# Patient Record
Sex: FEMALE | Race: BLACK OR AFRICAN AMERICAN | NOT HISPANIC OR LATINO | URBAN - METROPOLITAN AREA
[De-identification: names, ages, dates, MRNs, and addresses within clinical notes are randomized per-mention and may not be internally consistent; named-entity substitution may affect disease eponyms.]

---

## 2018-11-01 ENCOUNTER — OFFICE (OUTPATIENT)
Dept: URBAN - METROPOLITAN AREA CLINIC 14 | Facility: CLINIC | Age: 33
End: 2018-11-01

## 2018-11-01 VITALS
DIASTOLIC BLOOD PRESSURE: 88 MMHG | HEIGHT: 62 IN | SYSTOLIC BLOOD PRESSURE: 132 MMHG | WEIGHT: 278 LBS | HEART RATE: 79 BPM

## 2018-11-01 DIAGNOSIS — R14.0 ABDOMINAL DISTENSION (GASEOUS): ICD-10-CM

## 2018-11-01 DIAGNOSIS — R10.84 GENERALIZED ABDOMINAL PAIN: ICD-10-CM

## 2018-11-01 DIAGNOSIS — E66.01 MORBID (SEVERE) OBESITY DUE TO EXCESS CALORIES: ICD-10-CM

## 2018-11-01 DIAGNOSIS — R19.7 DIARRHEA, UNSPECIFIED: ICD-10-CM

## 2018-11-01 DIAGNOSIS — R93.3 ABNORMAL FINDINGS ON DIAGNOSTIC IMAGING OF OTHER PARTS OF DI: ICD-10-CM

## 2018-11-01 DIAGNOSIS — R11.0 NAUSEA: ICD-10-CM

## 2018-11-01 DIAGNOSIS — K80.20 CALCULUS OF GALLBLADDER WITHOUT CHOLECYSTITIS WITHOUT OBSTRU: ICD-10-CM

## 2018-11-01 DIAGNOSIS — R19.4 CHANGE IN BOWEL HABIT: ICD-10-CM

## 2018-11-01 PROCEDURE — 99204 OFFICE O/P NEW MOD 45 MIN: CPT | Performed by: INTERNAL MEDICINE

## 2018-11-01 RX ORDER — SODIUM PICOSULFATE, MAGNESIUM OXIDE, AND ANHYDROUS CITRIC ACID 10; 3.5; 12 MG/160ML; G/160ML; G/160ML
LIQUID ORAL
Qty: 1 | Refills: 0 | Status: ACTIVE
Start: 2018-11-01

## 2018-11-01 NOTE — SERVICENOTES
Though she has gallstones, her symptoms are acute in onset in appear most consistent with a viral colitis.  Given her recent antibiotics C diff is in the differential so we will check some stool studies.  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  She apparently has had some issues with diarrhea in the past and so because of this and her imaging study she is interested in colonoscopic evaluation.  We will have her complete her course of antibiotics and schedule colonoscopy in about a month or so.  If symptoms worsen the meantime we can proceed earlier.  She does appear to already be getting better.  If she starts having more upper abdominal symptoms such as symptoms consistent with biliary colic then we may refer her to a surgeon but at this time, given her acute onset of symptoms and improvement as well as imaging studies suggesting colitis we will hold off on surgical referral for her gallstones unless she has more typical symptoms.

## 2018-11-01 NOTE — SERVICEHPINOTES
Ms. 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 The patient states that she was in her normal state of health until last Tuesday night when she started having acute onset abdominal pain associated with a change in bowel habits with multiple frequent bowel movements which were initially formed but then became very soft to loose. There was no watery diarrhea. There was no rectal bleeding. She was having diarrhea about 10 times a day. Because of worsening symptoms and worsening pain she did go to the emergency department this past Monday where blood work was overall normal but CT scan did reveal numerous stones within the gallbladder with no evidence of cholecystitis or choledocholithiasis and diffuse wall thickening with pericolonic stranding throughout the proximal mid right colon consistent with colitis. The patient was given Cipro and Flagyl for 10 day course and states that since then her symptoms have slowly improved. She states that she has less pain with eating now and has tolerated food. Her nausea has improved. Her bowel habits have also improved but are not quite back to normal. She states that prior to her symptoms starting she had just completed a course of cefuroxime. She denies any first-degree family history of colon cancer but thinks that her mother had colon polyps. She denies any first-degree family history of inflammatory bowel disease. She has never had a colonoscopy. She states that at baseline her stomach is week but she does not have any issues with severe abdominal pain or diarrhea. She denies any right upper quadrant pain or pain radiating to her flank. She denies any jaundice.

## 2021-12-08 ENCOUNTER — CLINICAL SUPPORT (OUTPATIENT)
Dept: OCCUPATIONAL MEDICINE | Facility: URGENT CARE | Age: 36
End: 2021-12-08

## 2021-12-08 DIAGNOSIS — Z02.89 ENCOUNTER FOR PHYSICAL EXAMINATION RELATED TO EMPLOYMENT: Primary | ICD-10-CM

## 2021-12-08 PROCEDURE — 97799 UNLISTED PHYSCL MED/REHAB PX: CPT | Mod: FIT | Performed by: STUDENT IN AN ORGANIZED HEALTH CARE EDUCATION/TRAINING PROGRAM

## 2022-03-18 ENCOUNTER — TELEPHONE (OUTPATIENT)
Dept: DERMATOLOGY | Facility: CLINIC | Age: 37
End: 2022-03-18

## 2022-03-18 NOTE — TELEPHONE ENCOUNTER
Caller would like to discuss RS Appointment     Patient: Virgen Em    Callback Number: [phone]    Best Availability: as soon as possible    Initial Appt:3/21/22    Provider: karthik    Reason for rs: has another appt at this time    PAS attempted to contact FD? yes    Additional Info: Needs to reschedule above appt's time, she is wanting to see if it can be moved to a later time      I advised caller of a callback by 24-48 hours.

## 2022-03-23 ENCOUNTER — OFFICE VISIT (OUTPATIENT)
Dept: URGENT CARE | Facility: URGENT CARE | Age: 37
End: 2022-03-23
Payer: COMMERCIAL

## 2022-03-23 VITALS
TEMPERATURE: 98.1 F | HEIGHT: 62 IN | DIASTOLIC BLOOD PRESSURE: 94 MMHG | HEART RATE: 97 BPM | RESPIRATION RATE: 16 BRPM | WEIGHT: 261 LBS | OXYGEN SATURATION: 100 % | SYSTOLIC BLOOD PRESSURE: 154 MMHG | BODY MASS INDEX: 48.03 KG/M2

## 2022-03-23 DIAGNOSIS — R11.0 NAUSEA: ICD-10-CM

## 2022-03-23 DIAGNOSIS — R19.7 DIARRHEA, UNSPECIFIED TYPE: Primary | ICD-10-CM

## 2022-03-23 LAB
ALBUMIN SERPL-MCNC: 3.8 G/DL (ref 3.3–5.5)
ALP SERPL-CCNC: 69 U/L (ref 42–141)
ALT SERPL-CCNC: 19 U/L (ref 10–47)
ANION GAP SERPL CALC-SCNC: 7 MMOL/L (ref 3–11)
AST SERPL-CCNC: 22 U/L
BASOPHILS # BLD AUTO: 0.03 10*3/UL
BASOPHILS NFR BLD AUTO: 0 % (ref 0–2)
BILIRUB SERPL-MCNC: 0.5 MG/DL (ref 0.2–1.4)
BUN SERPL-MCNC: 9 MG/DL (ref 7–25)
CALCIUM ALBUM COR SERPL-MCNC: 9.9 MG/DL (ref 8.6–10.3)
CALCIUM SERPL-MCNC: 9.7 MG/DL (ref 8.6–10.3)
CHLORIDE SERPL-SCNC: 107 MMOL/L (ref 98–107)
CO2 SERPL-SCNC: 23 MMOL/L (ref 21–32)
CREAT SERPL-MCNC: 1 MG/DL (ref 0.6–1.1)
EOSINOPHIL # BLD AUTO: 0.19 10*3/UL
EOSINOPHIL NFR BLD AUTO: 2 % (ref 0–3)
ERYTHROCYTE [DISTWIDTH] IN BLOOD BY AUTOMATED COUNT: 13.4 % (ref 11.5–14)
GFR SERPL CREATININE-BSD FRML MDRD: 75 ML/MIN/1.73M*2
GLUCOSE SERPL-MCNC: 90 MG/DL (ref 70–105)
HCT VFR BLD AUTO: 38.9 % (ref 34–45)
HGB BLD-MCNC: 12.9 G/DL (ref 11.5–15.5)
LYMPHOCYTES # BLD AUTO: 3.28 10*3/UL
LYMPHOCYTES NFR BLD AUTO: 39 % (ref 11–47)
MCH RBC QN AUTO: 29.1 PG (ref 28–33)
MCHC RBC AUTO-ENTMCNC: 33.2 G/DL (ref 32–36)
MCV RBC AUTO: 87.8 FL (ref 81–97)
MONOCYTES # BLD AUTO: 0.51 10*3/UL
MONOCYTES NFR BLD AUTO: 6 % (ref 3–11)
NEUTROPHILS # BLD AUTO: 4.32 10*3/UL
NEUTROPHILS NFR BLD AUTO: 52 % (ref 41–81)
PLATELET # BLD AUTO: 373 10*3/UL (ref 140–350)
PMV BLD AUTO: 7 FL (ref 6.9–10.8)
POTASSIUM SERPL-SCNC: 3.7 MMOL/L (ref 3.5–5.1)
PROT SERPL-MCNC: 7.5 G/DL (ref 6.4–8.1)
RBC # BLD AUTO: 4.43 10*6/ΜL (ref 3.7–5.3)
SODIUM SERPL-SCNC: 137 MMOL/L (ref 128–145)
WBC # BLD AUTO: 8.3 10*3/UL (ref 4.5–10.5)

## 2022-03-23 PROCEDURE — 85025 COMPLETE CBC W/AUTO DIFF WBC: CPT | Performed by: PHYSICIAN ASSISTANT

## 2022-03-23 PROCEDURE — 99203 OFFICE O/P NEW LOW 30 MIN: CPT | Performed by: PHYSICIAN ASSISTANT

## 2022-03-23 PROCEDURE — 36415 COLL VENOUS BLD VENIPUNCTURE: CPT | Performed by: PHYSICIAN ASSISTANT

## 2022-03-23 PROCEDURE — 80053 COMPREHEN METABOLIC PANEL: CPT | Performed by: PHYSICIAN ASSISTANT

## 2022-03-23 RX ORDER — MILNACIPRAN HYDROCHLORIDE 100 MG/1
1 TABLET, FILM COATED ORAL 2 TIMES DAILY
COMMUNITY
Start: 2022-01-07

## 2022-03-23 RX ORDER — LAMOTRIGINE 100 MG/1
100 TABLET ORAL DAILY
COMMUNITY
End: 2022-04-14 | Stop reason: DRUGHIGH

## 2022-03-23 RX ORDER — PANTOPRAZOLE SODIUM 40 MG/1
40 TABLET, DELAYED RELEASE ORAL DAILY
COMMUNITY
Start: 2022-02-19

## 2022-03-23 RX ORDER — ONDANSETRON HYDROCHLORIDE 8 MG/1
8 TABLET, FILM COATED ORAL EVERY 8 HOURS PRN
COMMUNITY
Start: 2022-03-09 | End: 2022-07-05 | Stop reason: SDUPTHER

## 2022-03-23 RX ORDER — AZITHROMYCIN 250 MG/1
TABLET, FILM COATED ORAL EVERY 24 HOURS
COMMUNITY
End: 2022-04-14 | Stop reason: ALTCHOICE

## 2022-03-23 RX ORDER — FLUTICASONE FUROATE AND VILANTEROL 100; 25 UG/1; UG/1
POWDER RESPIRATORY (INHALATION)
COMMUNITY
End: 2022-04-14 | Stop reason: ALTCHOICE

## 2022-03-23 RX ORDER — LEVOTHYROXINE SODIUM 25 UG/1
TABLET ORAL
COMMUNITY
End: 2022-04-14 | Stop reason: ALTCHOICE

## 2022-03-23 RX ORDER — MONTELUKAST SODIUM 10 MG/1
TABLET ORAL
COMMUNITY
End: 2022-04-14 | Stop reason: ALTCHOICE

## 2022-03-23 RX ORDER — ALBUTEROL SULFATE 90 UG/1
INHALANT RESPIRATORY (INHALATION)
COMMUNITY

## 2022-03-23 RX ORDER — NORETHINDRONE AND ETHINYL ESTRADIOL 1; .035 MG/1; MG/1
TABLET ORAL
COMMUNITY

## 2022-03-23 RX ORDER — CHOLECALCIFEROL (VITAMIN D3) 50 MCG
TABLET ORAL
COMMUNITY

## 2022-03-23 RX ORDER — CLINDAMYCIN PHOSPHATE 10 UG/ML
LOTION TOPICAL
COMMUNITY
End: 2022-04-14 | Stop reason: ALTCHOICE

## 2022-03-23 RX ORDER — TOPIRAMATE 50 MG/1
3 CAPSULE, EXTENDED RELEASE ORAL DAILY
COMMUNITY
Start: 2022-02-19

## 2022-03-23 RX ORDER — ASPIRIN 325 MG
TABLET, DELAYED RELEASE (ENTERIC COATED) ORAL
COMMUNITY

## 2022-03-23 RX ORDER — ONDANSETRON 4 MG/1
4 TABLET, ORALLY DISINTEGRATING ORAL AS NEEDED
Qty: 12 TABLET | Refills: 0 | Status: SHIPPED | OUTPATIENT
Start: 2022-03-23 | End: 2022-04-14 | Stop reason: SDUPTHER

## 2022-03-23 RX ORDER — METHOCARBAMOL 750 MG/1
TABLET, FILM COATED ORAL
COMMUNITY

## 2022-03-23 RX ORDER — METOPROLOL SUCCINATE 25 MG/1
25 TABLET, EXTENDED RELEASE ORAL DAILY
COMMUNITY
Start: 2022-03-01 | End: 2022-04-14 | Stop reason: ALTCHOICE

## 2022-03-23 ASSESSMENT — PAIN SCALES - GENERAL: PAINLEVEL: 0-NO PAIN

## 2022-03-24 NOTE — PROGRESS NOTES
"Urgent Care Visit Note    Subjective   Chief Complaint  Chief Complaint   Patient presents with   • Diarrhea     C/o diarrhea and nausea since 3/18/22. Reports her stomach cramps with meals and she gets diarrhea within 10-15 minutes. Pt has tried immodium and pepto without relief.       History of Present Illness  Virgen Em is a 37 y.o. female presenting for diarrhea onset over the past 5 to 6 days.  Patient describes the diarrhea as liquidy, and brown.  Over the past few days it is taken on a \"green tinge\", secondary to ingesting Pepto-Bismol for her symptoms.  She reports that her symptoms are aggravated by eating.  She has been attempting to stay hydrated with Gatorade.  She does report eating takeout food prior to onset of symptoms, however her fellow members are not exhibiting similar symptoms.  She otherwise denies any fever, chills, urinary symptoms, vomiting.  No history of food allergies.  Denies chance of pregnancy; is currently on birth control and has not been sexually active recently.  Past abdominal surgical history includes cholecystectomy in November 2021    Review of Systems  Pertinent positives and negatives as per the HPI    Past Medical History:   Diagnosis Date   • Bipolar 2 disorder (CMS/HCC) (HCC)    • Migraines    • POTS (postural orthostatic tachycardia syndrome)          Current Outpatient Medications:   •  Trokendi XR 50 mg capsule,extended release 24hr, Take 3 capsules by mouth daily, Disp: , Rfl:   •  pantoprazole (PROTONIX) 40 mg EC tablet, Take 40 mg by mouth daily, Disp: , Rfl:   •  ondansetron (ZOFRAN) 8 mg tablet, Take 8 mg by mouth every 8 (eight) hours as needed, Disp: , Rfl:   •  omega 3-dha-epa-fish oil 60- mg capsule, 1 capsule, Disp: , Rfl:   •  norethindrone-ethin estradioL (Nortrel 1/35, 21,) 1-35 mg-mcg (21) tablet, norethindrone 1 mg-ethinyl estradiol 20 mcg (21)-iron 75 mg (7) tablet, Disp: , Rfl:   •  Savella 100 mg tablet, Take 1 tablet by mouth 2 (two) " "times a day, Disp: , Rfl:   •  metoprolol succinate XL (TOPROL-XL) 25 mg 24 hr tablet, Take 25 mg by mouth daily, Disp: , Rfl:   •  methocarbamoL (ROBAXIN) 750 mg tablet, methocarbamol 750 mg tablet, Disp: , Rfl:   •  albuterol HFA 90 mcg/actuation inhaler, proair hfa 108 (90 base) mcg/act aers, Disp: , Rfl:   •  lamoTRIgine (LaMICtal) 100 mg tablet, Take 100 mg by mouth daily, Disp: , Rfl:   •  montelukast (SINGULAIR) 10 mg tablet, montelukast 10 mg tablet, Disp: , Rfl:   •  levothyroxine 25 mcg tablet, levothyroxine 25 mcg tablet, Disp: , Rfl:   •  fluticasone furoate-vilanteroL (BREO ELLIPTA) 100-25 mcg/dose blister with device inhaler, Breo Ellipta 100 mcg-25 mcg/dose powder for inhalation, Disp: , Rfl:   •  clindamycin (CLEOCIN T) 1 % lotion, clindamycin 1 % lotion, Disp: , Rfl:   •  cholecalciferol, vitamin D3, 50 mcg (2,000 unit) tablet, 1 tablet, Disp: , Rfl:   •  azithromycin (ZITHROMAX) 250 mg tablet, daily, Disp: , Rfl:   •  ondansetron ODT (ZOFRAN-ODT) 4 mg disintegrating tablet, Take 1 tablet (4 mg total) by mouth as needed for nausea or vomiting (every 6 hours) for up to 12 doses, Disp: 12 tablet, Rfl: 0    Objective   Vital Signs  /94 (BP Location: Left arm, Patient Position: Sitting)   Pulse 97   Temp 36.7 °C (98.1 °F) (Temporal)   Resp 16   Ht 1.575 m (5' 2\")   Wt 118.4 kg (261 lb)   SpO2 100%   BMI 47.74 kg/m²     Physical Exam  Vitals and nursing note reviewed.   Constitutional:       General: She is not in acute distress.     Appearance: She is not ill-appearing or toxic-appearing.   HENT:      Head: Normocephalic and atraumatic.   Cardiovascular:      Rate and Rhythm: Normal rate and regular rhythm.   Pulmonary:      Effort: Pulmonary effort is normal. No respiratory distress.      Breath sounds: Normal breath sounds.   Abdominal:      General: There is no distension.      Palpations: Abdomen is soft.      Tenderness: There is no right CVA tenderness, left CVA tenderness, guarding " or rebound.      Hernia: No hernia is present.      Comments: Bowel sounds currently quiet.  No bruit.  Generalized discomfort, without specific tenderness on examination.   Skin:     General: Skin is warm and dry.   Neurological:      General: No focal deficit present.      Mental Status: She is alert and oriented to person, place, and time. Mental status is at baseline.         Lab Review  Recent Results (from the past 12 hour(s))   CBC w/auto differential Blood, Venous    Collection Time: 03/23/22  8:36 PM   Result Value Ref Range    WBC 8.3 4.5 - 10.5 10*3/uL    RBC 4.43 3.70 - 5.30 10*6/µL    Hemoglobin 12.9 11.5 - 15.5 g/dL    Hematocrit 38.9 34.0 - 45.0 %    MCV 87.8 81.0 - 97.0 fL    MCH 29.1 28.0 - 33.0 pg    MCHC 33.2 32.0 - 36.0 g/dL    RDW 13.4 11.5 - 14.0 %    Platelets 373 (H) 140 - 350 10*3/uL    MPV 7.0 6.9 - 10.8 fL    Neutrophils% 52 41 - 81 %    Lymphocytes% 39 11 - 47 %    Monocytes% 6 3 - 11 %    Eosinophils% 2 0 - 3 %    Basophils% 0 0 - 2 %    ANC (auto diff) 4.32 10*3/UL    Lymphocytes Absolute 3.28 10*3/uL    Monocytes Absolute 0.51 10*3/uL    Eosinophils Absolute 0.19 10*3/uL    Basophils Absolute 0.03 10*3/uL   Comprehensive metabolic panel Blood, Venous    Collection Time: 03/23/22  8:36 PM   Result Value Ref Range    Sodium 137 128 - 145 mmol/L    Potassium 3.7 3.5 - 5.1 MMOL/L    Chloride 107 98 - 107 mmol/L    CO2 23 21 - 32 mmol/L    Anion Gap 7 3 - 11 mmol/L    BUN 9 7 - 25 mg/dL    Creatinine 1.00 0.60 - 1.10 mg/dL    Glucose 90 70 - 105 mg/dL    Calcium 9.7 8.6 - 10.3 mg/dL    AST 22 <40 U/L    ALT (SGPT) 19 10 - 47 U/L    Alkaline Phosphatase 69 42 - 141 U/L    Total Protein 7.5 6.4 - 8.1 g/dL    Albumin 3.8 3.3 - 5.5 g/dL    Total Bilirubin 0.50 0.20 - 1.40 mg/dL    Corrected Calcium 9.9 8.6 - 10.3 mg/dL    eGFR 75 >60 mL/min/1.73m*2       Radiology Review  No results found.    Assessment/Plan   CBC demonstrates no significant abnormalities.  CMP demonstrates no significant  abnormalities.  Pathogen panel pending    Patient history and examination consistent with episodic diarrhea over the past 5-6 days.  Stools are described as nonbloody, and worsening with food.  Her laboratory evaluation and physical examination are largely reassuring.  I suspect self-limiting illness, however will obtain stool pathogen panel.  Patient will be notified of results, and if further treatment is needed.  She is encouraged to continue with fluid diet, and given other dietary instructions below.  Imodium for any ongoing diarrheal episodes.  She otherwise afebrile and hemodynamically stable.  Okay for outpatient management.  She understands and agrees to the plan.  All questions answered.    Patient Education: Ready to learn, no apparent learning barriers were identified; learning preference includes listening.  Explained diagnosis and treatment plan; patient/caregiver expressed understanding of the content. All questions answered.     Patient Instructions     1.  Please return the stool pathogen panel at your convenience.  We will call you with results when they return.  2.  Please see the attached instructions regarding dietary changes for diarrhea symptoms.  3.  Continue taking Pepto-Bismol, Maalox, and Imodium as needed.  Patient Education     Food Choices to Help Relieve Diarrhea, Adult  Diarrhea can make you feel weak and cause you to become dehydrated. It is important to choose the right foods and drinks to:  · Relieve diarrhea.  · Replace lost fluids and nutrients.  · Prevent dehydration.  What are tips for following this plan?  Relieving diarrhea  · Avoid foods that make your diarrhea worse. These may include:  ? Foods and beverages sweetened with high-fructose corn syrup, honey, or sweeteners such as xylitol, sorbitol, and mannitol.  ? Fried, greasy, or spicy foods.  ? Raw fruits and vegetables.  · Eat foods that are rich in probiotics. These include foods such as yogurt and fermented milk  products. Probiotics can help increase healthy bacteria in your stomach and intestines (gastrointestinal tract or GI tract). This may help digestion and stop diarrhea.  · If you have lactose intolerance, avoid dairy products. These may make your diarrhea worse.  · Take medicine to help stop diarrhea only as told by your health care provider.  Replacing nutrients  · Eat bland, easy-to-digest foods in small amounts as you are able, until your diarrhea starts to get better. These foods include bananas, applesauce, rice, toast, and crackers.  · Gradually reintroduce nutrient-rich foods as tolerated or as told by your health care provider. This includes:  ? Well-cooked protein foods, such as eggs, lean meats like fish or chicken without skin, and tofu.  ? Peeled, seeded, and soft-cooked fruits and vegetables.  ? Low-fat dairy products.  ? Whole grains.  · Take vitamin and mineral supplements as told by your health care provider.    Preventing dehydration  · Start by sipping water or a solution to prevent dehydration (oral rehydration solution, ORS). This is a drink that helps replace fluids and minerals your body has lost. You can buy an ORS at pharmacies and retail stores.  · Try to drink at least 8-10 cups (2,000-2,500 mL) of fluid each day to help replace lost fluids. If you have urine that is pale yellow, you are getting enough fluids.  · You may drink other liquids in addition to water, such as fruit juice that you have added water to (diluted fruit juice) or low-calorie sports drinks, as tolerated or as told by your health care provider.  · Avoid drinks with caffeine, such as coffee, tea, or soft drinks.  · Avoid alcohol.    Summary  · When you have diarrhea, it is important to choose the right foods and drinks to relieve diarrhea, to replace lost fluids and nutrients, and to prevent dehydration.  · Make sure you drink enough fluid to keep your urine pale yellow.  · You may benefit from eating bland foods at first.  Gradually reintroduce healthy, nutrient-rich foods as tolerated or as told by your health care provider.  · Avoid foods that make your diarrhea worse, such as fried, greasy, or spicy foods.  This information is not intended to replace advice given to you by your health care provider. Make sure you discuss any questions you have with your health care provider.  Document Revised: 02/02/2021 Document Reviewed: 02/02/2021  [employer] Patient Education © 2021 [employer]           Coleen Alexandra PA-C  3/23/2022

## 2022-03-24 NOTE — PATIENT INSTRUCTIONS
1.  Please return the stool pathogen panel at your convenience.  We will call you with results when they return.  2.  Please see the attached instructions regarding dietary changes for diarrhea symptoms.  3.  Continue taking Pepto-Bismol, Maalox, and Imodium as needed.  Patient Education     Food Choices to Help Relieve Diarrhea, Adult  Diarrhea can make you feel weak and cause you to become dehydrated. It is important to choose the right foods and drinks to:  · Relieve diarrhea.  · Replace lost fluids and nutrients.  · Prevent dehydration.  What are tips for following this plan?  Relieving diarrhea  · Avoid foods that make your diarrhea worse. These may include:  ? Foods and beverages sweetened with high-fructose corn syrup, honey, or sweeteners such as xylitol, sorbitol, and mannitol.  ? Fried, greasy, or spicy foods.  ? Raw fruits and vegetables.  · Eat foods that are rich in probiotics. These include foods such as yogurt and fermented milk products. Probiotics can help increase healthy bacteria in your stomach and intestines (gastrointestinal tract or GI tract). This may help digestion and stop diarrhea.  · If you have lactose intolerance, avoid dairy products. These may make your diarrhea worse.  · Take medicine to help stop diarrhea only as told by your health care provider.  Replacing nutrients  · Eat bland, easy-to-digest foods in small amounts as you are able, until your diarrhea starts to get better. These foods include bananas, applesauce, rice, toast, and crackers.  · Gradually reintroduce nutrient-rich foods as tolerated or as told by your health care provider. This includes:  ? Well-cooked protein foods, such as eggs, lean meats like fish or chicken without skin, and tofu.  ? Peeled, seeded, and soft-cooked fruits and vegetables.  ? Low-fat dairy products.  ? Whole grains.  · Take vitamin and mineral supplements as told by your health care provider.    Preventing dehydration  · Start by sipping water  or a solution to prevent dehydration (oral rehydration solution, ORS). This is a drink that helps replace fluids and minerals your body has lost. You can buy an ORS at pharmacies and retail stores.  · Try to drink at least 8-10 cups (2,000-2,500 mL) of fluid each day to help replace lost fluids. If you have urine that is pale yellow, you are getting enough fluids.  · You may drink other liquids in addition to water, such as fruit juice that you have added water to (diluted fruit juice) or low-calorie sports drinks, as tolerated or as told by your health care provider.  · Avoid drinks with caffeine, such as coffee, tea, or soft drinks.  · Avoid alcohol.    Summary  · When you have diarrhea, it is important to choose the right foods and drinks to relieve diarrhea, to replace lost fluids and nutrients, and to prevent dehydration.  · Make sure you drink enough fluid to keep your urine pale yellow.  · You may benefit from eating bland foods at first. Gradually reintroduce healthy, nutrient-rich foods as tolerated or as told by your health care provider.  · Avoid foods that make your diarrhea worse, such as fried, greasy, or spicy foods.  This information is not intended to replace advice given to you by your health care provider. Make sure you discuss any questions you have with your health care provider.  Document Revised: 02/02/2021 Document Reviewed: 02/02/2021  [employer] Patient Education © 2021 [employer] Inc.

## 2022-04-11 ENCOUNTER — TELEPHONE (OUTPATIENT)
Dept: DERMATOLOGY | Facility: CLINIC | Age: 37
End: 2022-04-11

## 2022-04-11 NOTE — TELEPHONE ENCOUNTER
Caller would like to request: Return Call      Patient: Virgen Em    Callback Number: [phone]    Best call Availability: as soon as possible    Return call to: Maria Del Carmen    Additional Info: needing to r/s 4/7 appt, she is having flare-ups with her heart and she is not feeling well    PAS attempted to call the FD? Yes, with patients    I advised caller of a callback by 24-48 hours.

## 2022-04-14 ENCOUNTER — OFFICE VISIT (OUTPATIENT)
Dept: FAMILY MEDICINE | Facility: CLINIC | Age: 37
End: 2022-04-14
Payer: COMMERCIAL

## 2022-04-14 VITALS
BODY MASS INDEX: 48.56 KG/M2 | TEMPERATURE: 98.5 F | SYSTOLIC BLOOD PRESSURE: 136 MMHG | HEART RATE: 82 BPM | WEIGHT: 265.5 LBS | OXYGEN SATURATION: 100 % | DIASTOLIC BLOOD PRESSURE: 84 MMHG

## 2022-04-14 DIAGNOSIS — G90.A POTS (POSTURAL ORTHOSTATIC TACHYCARDIA SYNDROME): Primary | ICD-10-CM

## 2022-04-14 DIAGNOSIS — R11.0 NAUSEA: ICD-10-CM

## 2022-04-14 PROCEDURE — 99213 OFFICE O/P EST LOW 20 MIN: CPT | Performed by: STUDENT IN AN ORGANIZED HEALTH CARE EDUCATION/TRAINING PROGRAM

## 2022-04-14 RX ORDER — ONDANSETRON 4 MG/1
4 TABLET, ORALLY DISINTEGRATING ORAL AS NEEDED
Qty: 20 TABLET | Refills: 0 | Status: SHIPPED | OUTPATIENT
Start: 2022-04-14 | End: 2022-05-14

## 2022-04-14 RX ORDER — METOPROLOL SUCCINATE 50 MG/1
50 TABLET, EXTENDED RELEASE ORAL DAILY
Qty: 90 TABLET | Refills: 0 | Status: SHIPPED | OUTPATIENT
Start: 2022-04-14 | End: 2022-08-01

## 2022-04-14 RX ORDER — LAMOTRIGINE 25 MG/1
100 TABLET ORAL DAILY
COMMUNITY

## 2022-04-14 ASSESSMENT — PAIN SCALES - GENERAL: PAINLEVEL: 4

## 2022-04-18 NOTE — PROGRESS NOTES
GENERAL OUTPATIENT NOTE    PCP:  Codey Koenig DO  DATE OF SERVICE: 4/17/2022    CC:     Chief Complaint   Patient presents with   • POTS   • Chest Pain       HPI:    Virgen Em is a 37 y.o. old female presenting to the clinic for palpitations and chest pain.  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  She was started on metoprolol XL 25 mg daily, and notes that initially this did help keep her symptoms at bay.  Recently, however, she has noticed more palpitations and feeling that her heart is racing when she sits or stands up.  She gets dizzy and lightheaded with this as well.  She also endorses episodes of left-sided chest pain that seem to occur with activity.  She feels this is due to positional changes, and does not feel like it is cardiac in origin.  She was referred to a cardiologist back at the time of diagnosis, but notes she was unable to follow-up with them.      ROS:  Negative but for pertinent positives noted in HPI    OBJECTIVE:  Vitals:    04/14/22 1425   BP: 136/84   Pulse: 82   Temp: 36.9 °C (98.5 °F)   SpO2: 100%       Physical Exam  Vitals and nursing note reviewed.   Constitutional:       General: She is not in acute distress.     Appearance: Normal appearance. She is well-developed. She is obese. She is not toxic-appearing.   HENT:      Head: Normocephalic and atraumatic.      Right Ear: External ear normal.      Left Ear: External ear normal.      Nose: Nose normal.   Eyes:      Extraocular Movements: Extraocular movements intact.      Conjunctiva/sclera: Conjunctivae normal.   Cardiovascular:      Rate and Rhythm: Normal rate and regular rhythm.      Heart sounds: No murmur heard.  Pulmonary:      Effort: Pulmonary effort is normal. No respiratory distress.      Breath sounds: Normal breath sounds.   Musculoskeletal:         General: Normal range of motion.      Cervical back: Normal range of motion and  neck supple.      Right lower leg: No edema.      Left lower leg: No edema.   Skin:     General: Skin is warm and dry.   Neurological:      General: No focal deficit present.      Mental Status: She is alert and oriented to person, place, and time.   Psychiatric:         Mood and Affect: Mood normal.         Behavior: Behavior normal.         ASSESSMENT:  Virgen was seen today for pots and chest pain.    Diagnoses and all orders for this visit:    POTS (postural orthostatic tachycardia syndrome)  -     metoprolol succinate XL (TOPROL-XL) 50 mg 24 hr tablet; Take 1 tablet (50 mg total) by mouth daily  -     Ambulatory referral to Cardiology; Future    Nausea  -     ondansetron ODT (ZOFRAN-ODT) 4 mg disintegrating tablet; Take 1 tablet (4 mg total) by mouth as needed for nausea or vomiting (every 6 hours)        Plan:  Virgen is a pleasant 37-year-old female who presents today to discuss symptoms of POTS.  Discussed various treatment options, including increasing dose of metoprolol as well as lifestyle modification.  I also offered referral to cardiology, as the patient was unable to establish with a specialist due to her move for her job.  The patient was agreeable to increasing dose of metoprolol today, as well as being referred to cardiology.  The patient does plan to move in the next month or two, and she was encouraged to contact our clinic should she have any issues with her dosage change.  The patient verbalized understanding and agreement with today's plan.      Maria Del Carmen Velazquez DO  8:52 PM, 4/17/2022.    A voice recognition program was used to aid in documentation of this record. Sometimes words are not printed exactly as they were spoken. While efforts were made to carefully edit and correct any inaccuracies, some errors may be present; please take these into context.  Please contact the provider if errors are identified.

## 2022-04-27 NOTE — PATIENT INSTRUCTIONS
"Avoid sunlight between the hours of 10 am and 2 pm, wear a broad spectrum, water resistant sunscreen with SPF of 30-50 year round. Reapply sunscreen every 2 hours and after exercising or swimming. Wearing a 6\" broad brimmed hat, a lip sunblock of SPF of 30-50, and sun protective clothing is also suggested year round.   Recommended sunscreens include Neutrogena Ultra Sheer Dry Touch SPF 50 or higher, Neutrogena Sheer Zinc Dry Touch SPF 50, and Vanicream Sport.  ---------------------------------------------------------------------  Monthly self-examination of your skin is important. Should any lesions, including moles, change in size, shape, color, itch, bleed or burn, contact our office for sooner evaluation.  ----------------------------------------------------------------------  **We are always looking for opportunities to improve your care and patient experience. You will be receiving a survey about your care via text or e-mail. If you come across questions in the survey that are not applicable to your visit, please leave these questions blank. Your satisfaction is important to us so please be honest. Your identity is kept confidential. Thank you!**    Hidradenitis Suppurativa is a condition without known cause or cure.  Lesions present most often in groin, underarms and under breasts.  Lesions can become large, tender and drain.  Known associations include smoking and obesity.  Improvement is often noted with cessation of smoking and weight loss.  Other helpful tips include white cotton, loose fitting undergarments.  Please follow further medication recommendations made by your physician and contact office should your condition flare.     Hidradenitis Suppurativa Diet: https://www.aad.org/public/diseases/a-z/[email or web address]-suppurativa-diet    Discussed Humira for HS in the future if needed. Dr. Meg Luevano for weight loss medicine if you desire to look into it.     I instructed patient to avoid pregnancy and maintain " adequate hydration status.  I also counseled about side effects including abnormal spotting, abdominal cramping, breast tenderness and growth, headaches. Do not get pregnant as it can cause feminization of a male fetus (in animal studies on high doses).  It is a potassium sparing diuretic and can cause hyperkalemia which is more likely in older age with poor renal function.  Do not take if your diet consists of large amounts of coconut water, bananas, or potassium containing foods/supplements.    Take 1 pill once a day for 1 week  Take 2 pills a day for 2nd week  Take 3 pills a day for 3rd week  Take 4 pills a day for the 4th week and continue on with this dose

## 2022-04-27 NOTE — PROGRESS NOTES
Subjective   New Dermatology Skin examination  Virgen Em is a 37 y.o. female who presents for a full body skin exam . Lesions of concern include: spot of concern on scalp x 1-2 months that she has noticed near her scar site.  Patient does not endorse a history of tanning bed use.  Patient skin history: Negative for personal history of non melanoma skin cancer, Negative for actinic keratosis, Negative personal history of melanoma,  Negative family history of melanoma.      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 WLE, lymph node biopsy, which was normal. [place], [place]    Every month or so, patient reports recurring boils under folds, breasts    Erythematous, inflamatory nodule on superior mons pubis and right axilla, scar      Review of Systems  Review of Systems   Constitutional: Negative for fatigue and fever.   Skin: Negative for rash.   Allergic/Immunologic: Negative for environmental allergies and immunocompromised state.   Hematological: Does not bruise/bleed easily.   All other systems reviewed and are negative.        Past Medical History:   Diagnosis Date   • Bipolar 2 disorder (CMS/HCC) (HCC)    • Migraines    • POTS (postural orthostatic tachycardia syndrome)           Past Surgical History:   Procedure Laterality Date   • CHOLECYSTECTOMY  11/2021   • LYMPH NODE BIOPSY  2010    year is approximate         family history includes Cancer in her father; Diabetes in her father; Hyperlipidemia in her mother; Hypertension in her father.      Medications  Current Outpatient Medications on File Prior to Visit   Medication Sig Dispense Refill   • lamoTRIgine (LaMICtal) 25 mg tablet Take 100 mg by mouth daily     • metoprolol succinate XL (TOPROL-XL) 50 mg 24 hr tablet Take 1 tablet (50 mg total) by mouth daily 90 tablet 0   • Trokendi XR 50 mg capsule,extended release 24hr Take 3 capsules by mouth daily     • pantoprazole (PROTONIX) 40 mg EC tablet Take 40  mg by mouth daily     • ondansetron (ZOFRAN) 8 mg tablet Take 8 mg by mouth every 8 (eight) hours as needed     • omega 3-dha-epa-fish oil 60- mg capsule 1 capsule     • norethindrone-ethin estradioL (Nortrel 1/35, 21,) 1-35 mg-mcg (21) tablet norethindrone 1 mg-ethinyl estradiol 20 mcg (21)-iron 75 mg (7) tablet     • Savella 100 mg tablet Take 1 tablet by mouth 2 (two) times a day     • methocarbamoL (ROBAXIN) 750 mg tablet methocarbamol 750 mg tablet     • cholecalciferol, vitamin D3, 50 mcg (2,000 unit) tablet 1 tablet     • albuterol HFA 90 mcg/actuation inhaler proair hfa 108 (90 base) mcg/act aers       No current facility-administered medications on file prior to visit.       Allergies  Adhesive and Pseudoephedrine      Physical Examination    Vital Signs There were no vitals taken for this visit.      Physical Exam Findings:   Constitutional: General Appearance: healthy-appearing, well-nourished, and well-developed. Level of Distress: NAD. Ambulation: ambulating normally.  Psychiatric: Insight: good judgement. Mental Status: normal mood and affect and active and alert. Orientation: to time, place, and person. Memory: recent memory normal and remote memory normal.  Head: normocephalic and atraumatic.  Eyes: Lids and Conjunctivae: no discharge or pallor and non-injected. Lens: clear. Sclerae: non-icteric.  Neurologic: Gait and Station: normal gait and station. Cranial Nerves: grossly intact. Coordination and Cerebellum: no tremor.    Lymph nodes exam:  Negative post auricular, clavicular, axillary adenopathy       A full body skin examination was performed including scalp, face, lips, mouth, ears, neck, both arms, chest, back, abdomen, buttocks, both legs, both feet    Pichardo skin type:IV     Trunk (including back, chest, abdomen): Pink brown plaque, left inferior neck. Hyperpigmented velvety plaques of neck.  Arms/Hands: Right axilla, resolving boil.   Buttocks: Not examined today  Legs: Brown  macules with regular borders and pigmentation right leg  Feet: Not examined today  Scalp: No suspicious lesions noted.   Face: Stuck on papules   Mouth: No suspicious lesions noted                                        Diagnoses and all orders for this visit:    Hidradenitis suppurativa  -     benzoyl peroxide (BENZAC AC) 10 % cleanser topical cleanser; Apply 1 application topically daily  -     clindamycin (Cleocin T) 1 % lotion; Apply 1 application topically 2 (two) times a day  -     metFORMIN XR (GLUCOPHAGE-XR) 750 mg 24 hr tablet; Take 1 tablet (750 mg total) by mouth daily with breakfast  -     spironolactone (Aldactone) 25 mg tablet; Take 1 tablet (25 mg total) by mouth daily    I instructed patient to avoid pregnancy and maintain adequate hydration status.  I also counseled about side effects including abnormal spotting, abdominal cramping, breast tenderness and growth, headaches. Do not get pregnant as it can cause feminization of a male fetus (in animal studies on high doses).  It is a potassium sparing diuretic and can cause hyperkalemia which is more likely in older age with poor renal function.  Do not take if your diet consists of large amounts of coconut water, bananas, or potassium containing foods/supplements.      Hidradenitis Suppurativa is a condition without known cause or cure.  Lesions present most often in groin, underarms and under breasts.  Lesions can become large, tender and drain.  Known associations include smoking and obesity.  Improvement is often noted with cessation of smoking and weight loss.  Other helpful tips include white cotton, loose fitting undergarments.  Please follow further medication recommendations made by your physician and contact office should your condition flare.      Skin exam, screening for cancer  -     Ambulatory referral to Dermatology    Acanthosis nigricans  Counseled patient on the dark velvety patches of the neck and underarms most commonly, which is due  to high sugars and/or insulin resistance of a prediabetic or diabetic state.  These will improve with insulin and sugar control, which is best achieved by dietary measures cutting out sugars/carbs especially in drinks, candy, sweets, and breads/pastas/chips, daily exercise of moving the body and getting the heart rate up with walking/running/jumping/sports every day, and weight loss especially around the belly.      Start metformin nightly    Nevus of right lower leg  Reassurance given for benign appearing nevi today, and to watch for any changes that would suggest atypia such as itching, bleeding, changing irregular shape, increased diameter or irregularity, and multiple colors in moles        Seborrheic keratoses  Discussed benign nature of seborrheic keratoses in detail I informed patient these are genetic finding and not related to chronic sun exposure.  Although they can appear abnormal in appearance they are noncancerous.  Encouraged patient to call should they note any new or changing lesions.      Scar of skin  History of pigmented epithelial melanocytoma treated as a melanoma  - Scars of previous Pigmented epithelial melanocytoma well healed without evidence of recurrence, will continue to monitor as a chronic condition due to risk of recurrence.  Discussed with patient necessity for continued monitoring of skin, sunscreen use with a broad spectrum sunscreen  and regular skin examinations to ensure no new or recurrent skin cancers.           I emphasized daily sunscreen use with an SPF of 30-50, broad spectrum and water resistant.  I directed reapplication every two hours.  I recommended wide brimmed hats.  Finally, we discussed danger signs of changing skin lesions including sores not healing and moles changing in size, shape or color.  Should any of these lesions occur before next appointment, I instructed patent to call sooner for evaluation.        Return in about 3 months (around 8/23/2022) for 3mo HS  recheck, 1 FBSE hx Pigmented epithelioid melanocytoma.

## 2022-05-23 ENCOUNTER — CONSULT (OUTPATIENT)
Dept: DERMATOLOGY | Facility: CLINIC | Age: 37
End: 2022-05-23
Payer: COMMERCIAL

## 2022-05-23 DIAGNOSIS — Z12.83 SKIN EXAM, SCREENING FOR CANCER: ICD-10-CM

## 2022-05-23 DIAGNOSIS — D22.71 NEVUS OF RIGHT LOWER LEG: ICD-10-CM

## 2022-05-23 DIAGNOSIS — L73.2 HIDRADENITIS SUPPURATIVA: Primary | ICD-10-CM

## 2022-05-23 DIAGNOSIS — L82.1 SEBORRHEIC KERATOSES: ICD-10-CM

## 2022-05-23 DIAGNOSIS — Q82.5: ICD-10-CM

## 2022-05-23 DIAGNOSIS — L83 ACANTHOSIS NIGRICANS: ICD-10-CM

## 2022-05-23 DIAGNOSIS — L90.5 SCAR OF SKIN: ICD-10-CM

## 2022-05-23 PROCEDURE — 99204 OFFICE O/P NEW MOD 45 MIN: CPT | Performed by: DERMATOLOGY

## 2022-05-23 RX ORDER — CLINDAMYCIN PHOSPHATE 10 UG/ML
1 LOTION TOPICAL 2 TIMES DAILY
Qty: 60 ML | Refills: 6 | Status: SHIPPED | OUTPATIENT
Start: 2022-05-23

## 2022-05-23 RX ORDER — METFORMIN HYDROCHLORIDE 750 MG/1
750 TABLET, EXTENDED RELEASE ORAL
Qty: 30 TABLET | Refills: 6 | Status: SHIPPED | OUTPATIENT
Start: 2022-05-23 | End: 2023-05-23

## 2022-05-23 RX ORDER — BENZOYL PEROXIDE 100 MG/ML
1 LIQUID TOPICAL DAILY
Qty: 141 G | Refills: 11 | Status: SHIPPED | OUTPATIENT
Start: 2022-05-23

## 2022-05-23 RX ORDER — SPIRONOLACTONE 25 MG/1
25 TABLET ORAL DAILY
Qty: 70 TABLET | Refills: 0 | Status: SHIPPED | OUTPATIENT
Start: 2022-05-23 | End: 2022-06-27 | Stop reason: ALTCHOICE

## 2022-05-23 ASSESSMENT — ENCOUNTER SYMPTOMS
FATIGUE: 0
BRUISES/BLEEDS EASILY: 0
FEVER: 0

## 2022-06-11 ENCOUNTER — APPOINTMENT (OUTPATIENT)
Dept: URGENT CARE | Facility: URGENT CARE | Age: 37
End: 2022-06-11
Payer: COMMERCIAL

## 2022-06-11 ENCOUNTER — APPOINTMENT (OUTPATIENT)
Dept: LAB | Facility: URGENT CARE | Age: 37
End: 2022-06-11
Payer: COMMERCIAL

## 2022-06-11 PROCEDURE — 99000 SPECIMEN HANDLING OFFICE-LAB: CPT | Mod: ESCRN | Performed by: STUDENT IN AN ORGANIZED HEALTH CARE EDUCATION/TRAINING PROGRAM

## 2022-06-26 DIAGNOSIS — L73.2 HIDRADENITIS SUPPURATIVA: ICD-10-CM

## 2022-06-27 DIAGNOSIS — L73.2 HIDRADENITIS SUPPURATIVA: Primary | ICD-10-CM

## 2022-06-27 RX ORDER — SPIRONOLACTONE 25 MG/1
25 TABLET ORAL DAILY
Qty: 30 TABLET | Refills: 2 | OUTPATIENT
Start: 2022-06-27 | End: 2023-06-27

## 2022-06-27 RX ORDER — SPIRONOLACTONE 100 MG/1
100 TABLET, FILM COATED ORAL DAILY
Qty: 60 TABLET | Refills: 0 | Status: SHIPPED | OUTPATIENT
Start: 2022-06-27 | End: 2022-08-26

## 2022-07-01 DIAGNOSIS — G90.A POTS (POSTURAL ORTHOSTATIC TACHYCARDIA SYNDROME): Primary | ICD-10-CM

## 2022-07-06 RX ORDER — ONDANSETRON HYDROCHLORIDE 8 MG/1
8 TABLET, FILM COATED ORAL EVERY 8 HOURS PRN
Qty: 20 TABLET | Refills: 1 | Status: SHIPPED | OUTPATIENT
Start: 2022-07-06

## 2022-07-30 DIAGNOSIS — G90.A POTS (POSTURAL ORTHOSTATIC TACHYCARDIA SYNDROME): ICD-10-CM

## 2022-08-01 RX ORDER — METOPROLOL SUCCINATE 50 MG/1
TABLET, EXTENDED RELEASE ORAL
Qty: 30 TABLET | Refills: 2 | Status: SHIPPED | OUTPATIENT
Start: 2022-08-01 | End: 2022-11-01

## 2022-08-01 NOTE — TELEPHONE ENCOUNTER
Refilled 3 months of metoprolol. Dose recently changed by Dr. Velazquez. Patient will need to follow up prior to additional refills.

## 2022-08-11 ENCOUNTER — TELEPHONE (OUTPATIENT)
Dept: DERMATOLOGY | Facility: CLINIC | Age: 37
End: 2022-08-11

## 2022-08-11 NOTE — TELEPHONE ENCOUNTER
lvm with pt to obtain updated insurance info. Also for child, mrn [id number]  Pt's [employer] termed 6/13/22.

## 2022-10-29 DIAGNOSIS — G90.A POTS (POSTURAL ORTHOSTATIC TACHYCARDIA SYNDROME): ICD-10-CM

## 2022-11-01 RX ORDER — METOPROLOL SUCCINATE 50 MG/1
TABLET, EXTENDED RELEASE ORAL
Qty: 90 TABLET | Refills: 0 | Status: SHIPPED | OUTPATIENT
Start: 2022-11-01

## 2022-11-01 NOTE — TELEPHONE ENCOUNTER
Refilled 90 days of metoprolol at the specified pharmacy.  It appears patient may have moved away from [place].  Please contact patient and confirm this.  If she has moved, she will need to establish care with a new provider at her new location.  If not, we will need to see her prior to the next refill in 90 days.

## 2022-11-15 NOTE — TELEPHONE ENCOUNTER
A letter has been sent to this patient regarding a follow up appointment prior to her next refill.

## 2023-02-22 ENCOUNTER — TELEPHONE (OUTPATIENT)
Dept: URGENT CARE | Age: 38
End: 2023-02-22

## 2023-02-22 ENCOUNTER — WALK IN (OUTPATIENT)
Dept: URGENT CARE | Age: 38
End: 2023-02-22

## 2023-02-22 ENCOUNTER — IMAGING SERVICES (OUTPATIENT)
Dept: GENERAL RADIOLOGY | Age: 38
End: 2023-02-22
Attending: FAMILY MEDICINE

## 2023-02-22 VITALS
DIASTOLIC BLOOD PRESSURE: 85 MMHG | TEMPERATURE: 97.9 F | RESPIRATION RATE: 16 BRPM | HEIGHT: 62 IN | HEART RATE: 69 BPM | SYSTOLIC BLOOD PRESSURE: 139 MMHG | OXYGEN SATURATION: 98 % | BODY MASS INDEX: 50.79 KG/M2 | WEIGHT: 276 LBS

## 2023-02-22 DIAGNOSIS — R06.2 WHEEZE: ICD-10-CM

## 2023-02-22 DIAGNOSIS — J18.9 WALKING PNEUMONIA: ICD-10-CM

## 2023-02-22 DIAGNOSIS — J18.9 WALKING PNEUMONIA: Primary | ICD-10-CM

## 2023-02-22 DIAGNOSIS — J02.9 SORE THROAT: ICD-10-CM

## 2023-02-22 DIAGNOSIS — R49.0 HOARSENESS OF VOICE: ICD-10-CM

## 2023-02-22 DIAGNOSIS — R53.81 MALAISE AND FATIGUE: ICD-10-CM

## 2023-02-22 DIAGNOSIS — R53.83 MALAISE AND FATIGUE: ICD-10-CM

## 2023-02-22 DIAGNOSIS — R09.81 CONGESTION OF NASAL SINUS: ICD-10-CM

## 2023-02-22 LAB
FLUAV RNA RESP QL NAA+PROBE: NOT DETECTED
FLUBV RNA RESP QL NAA+PROBE: NOT DETECTED
S PYO DNA THROAT QL NAA+PROBE: NOT DETECTED
SARS-COV-2 RNA RESP QL NAA+PROBE: NOT DETECTED
SERVICE CMNT-IMP: NORMAL
SERVICE CMNT-IMP: NORMAL

## 2023-02-22 PROCEDURE — 99214 OFFICE O/P EST MOD 30 MIN: CPT | Performed by: FAMILY MEDICINE

## 2023-02-22 PROCEDURE — 94640 AIRWAY INHALATION TREATMENT: CPT | Performed by: FAMILY MEDICINE

## 2023-02-22 PROCEDURE — 87651 STREP A DNA AMP PROBE: CPT | Performed by: INTERNAL MEDICINE

## 2023-02-22 PROCEDURE — 71046 X-RAY EXAM CHEST 2 VIEWS: CPT | Performed by: RADIOLOGY

## 2023-02-22 PROCEDURE — 0240U SARS-COV-2/INFLUENZA BY PCR: CPT | Performed by: INTERNAL MEDICINE

## 2023-02-22 RX ORDER — DOXYCYCLINE 100 MG/1
100 CAPSULE ORAL 2 TIMES DAILY
Qty: 20 CAPSULE | Refills: 0 | Status: SHIPPED | OUTPATIENT
Start: 2023-02-22 | End: 2023-03-04

## 2023-02-22 RX ORDER — GUAIFENESIN 600 MG/1
1200 TABLET, EXTENDED RELEASE ORAL 2 TIMES DAILY
Qty: 60 TABLET | Refills: 0 | Status: SHIPPED | OUTPATIENT
Start: 2023-02-22 | End: 2023-03-24

## 2023-02-22 RX ORDER — MEDICAL SUPPLY, MISCELLANEOUS
60 EACH MISCELLANEOUS 3 TIMES DAILY
Qty: 1000 ML | Refills: 1 | Status: SHIPPED | OUTPATIENT
Start: 2023-02-22 | End: 2023-02-27

## 2023-02-22 RX ORDER — IPRATROPIUM BROMIDE AND ALBUTEROL SULFATE 2.5; .5 MG/3ML; MG/3ML
3 SOLUTION RESPIRATORY (INHALATION) ONCE
Status: COMPLETED | OUTPATIENT
Start: 2023-02-22 | End: 2023-02-22

## 2023-02-22 RX ORDER — METOPROLOL SUCCINATE 50 MG/1
TABLET, EXTENDED RELEASE ORAL
COMMUNITY
Start: 2022-11-01

## 2023-02-22 RX ORDER — ALBUTEROL SULFATE 90 UG/1
2 AEROSOL, METERED RESPIRATORY (INHALATION) EVERY 6 HOURS
Qty: 8.5 G | Refills: 0 | Status: SHIPPED | OUTPATIENT
Start: 2023-02-22 | End: 2023-03-24

## 2023-02-22 RX ORDER — METOPROLOL SUCCINATE 25 MG/1
TABLET, EXTENDED RELEASE ORAL
COMMUNITY

## 2023-02-22 RX ADMIN — IPRATROPIUM BROMIDE AND ALBUTEROL SULFATE 3 ML: 2.5; .5 SOLUTION RESPIRATORY (INHALATION) at 16:49

## 2023-06-07 ENCOUNTER — APPOINTMENT (OUTPATIENT)
Dept: GENERAL RADIOLOGY | Age: 38
End: 2023-06-07
Attending: EMERGENCY MEDICINE

## 2023-06-07 ENCOUNTER — HOSPITAL ENCOUNTER (EMERGENCY)
Age: 38
Discharge: HOME | End: 2023-06-07
Attending: EMERGENCY MEDICINE

## 2023-06-07 VITALS
DIASTOLIC BLOOD PRESSURE: 74 MMHG | RESPIRATION RATE: 15 BRPM | TEMPERATURE: 97.6 F | SYSTOLIC BLOOD PRESSURE: 105 MMHG | HEART RATE: 77 BPM | OXYGEN SATURATION: 96 %

## 2023-06-07 DIAGNOSIS — S82.892A CLOSED FRACTURE OF LEFT ANKLE, INITIAL ENCOUNTER: Primary | ICD-10-CM

## 2023-06-07 DIAGNOSIS — M23.92 INTERNAL DERANGEMENT OF LEFT KNEE: ICD-10-CM

## 2023-06-07 PROCEDURE — 99283 EMERGENCY DEPT VISIT LOW MDM: CPT

## 2023-06-07 PROCEDURE — 10002803 HB RX 637: Performed by: PHYSICIAN ASSISTANT

## 2023-06-07 PROCEDURE — 73562 X-RAY EXAM OF KNEE 3: CPT | Performed by: RADIOLOGY

## 2023-06-07 PROCEDURE — 73562 X-RAY EXAM OF KNEE 3: CPT

## 2023-06-07 PROCEDURE — 29515 APPLICATION SHORT LEG SPLINT: CPT | Performed by: PHYSICIAN ASSISTANT

## 2023-06-07 PROCEDURE — 73610 X-RAY EXAM OF ANKLE: CPT | Performed by: RADIOLOGY

## 2023-06-07 PROCEDURE — 73610 X-RAY EXAM OF ANKLE: CPT

## 2023-06-07 RX ORDER — HYDROCODONE BITARTRATE AND ACETAMINOPHEN 5; 325 MG/1; MG/1
1 TABLET ORAL ONCE
Status: COMPLETED | OUTPATIENT
Start: 2023-06-07 | End: 2023-06-07

## 2023-06-07 RX ORDER — IBUPROFEN 600 MG/1
600 TABLET ORAL ONCE
Status: COMPLETED | OUTPATIENT
Start: 2023-06-07 | End: 2023-06-07

## 2023-06-07 RX ORDER — HYDROCODONE BITARTRATE AND ACETAMINOPHEN 5; 325 MG/1; MG/1
1 TABLET ORAL EVERY 6 HOURS PRN
Qty: 8 TABLET | Refills: 0 | Status: SHIPPED | OUTPATIENT
Start: 2023-06-07

## 2023-06-07 RX ADMIN — IBUPROFEN 600 MG: 600 TABLET, FILM COATED ORAL at 08:15

## 2023-06-07 RX ADMIN — HYDROCODONE BITARTRATE AND ACETAMINOPHEN 1 TABLET: 5; 325 TABLET ORAL at 08:15

## 2023-06-07 ASSESSMENT — ENCOUNTER SYMPTOMS
SHORTNESS OF BREATH: 0
WEAKNESS: 0
NUMBNESS: 0
FEVER: 0
NERVOUS/ANXIOUS: 0

## 2023-06-07 ASSESSMENT — PAIN SCALES - GENERAL
PAINLEVEL_OUTOF10: 8
PAINLEVEL_OUTOF10: 8
PAINLEVEL_OUTOF10: 6
PAINLEVEL_OUTOF10: 4

## 2023-06-07 ASSESSMENT — PAIN DESCRIPTION - PAIN TYPE: TYPE: ACUTE PAIN

## 2023-06-08 ENCOUNTER — NURSE TRIAGE (OUTPATIENT)
Dept: TELEHEALTH | Age: 38
End: 2023-06-08

## 2023-06-08 ASSESSMENT — ENCOUNTER SYMPTOMS
HEADACHES: 0
WOUND: 0

## 2023-06-15 ENCOUNTER — OFFICE VISIT (OUTPATIENT)
Dept: ORTHOPEDICS | Age: 38
End: 2023-06-15

## 2023-06-15 DIAGNOSIS — S93.402A SPRAIN OF LEFT ANKLE, UNSPECIFIED LIGAMENT, INITIAL ENCOUNTER: Primary | ICD-10-CM

## 2023-06-15 DIAGNOSIS — S82.392A CLOSED FRACTURE OF POSTERIOR MALLEOLUS OF LEFT TIBIA, INITIAL ENCOUNTER: ICD-10-CM

## 2023-06-15 DIAGNOSIS — M17.12 ARTHRITIS OF LEFT KNEE: ICD-10-CM

## 2023-06-15 DIAGNOSIS — E66.01 CLASS 3 SEVERE OBESITY WITH BODY MASS INDEX (BMI) OF 50.0 TO 59.9 IN ADULT, UNSPECIFIED OBESITY TYPE, UNSPECIFIED WHETHER SERIOUS COMORBIDITY PRESENT (CMD): ICD-10-CM

## 2023-06-15 PROCEDURE — L4361 PNEUMA/VAC WALK BOOT PRE OTS: HCPCS | Performed by: ORTHOPAEDIC SURGERY

## 2023-06-15 PROCEDURE — 99204 OFFICE O/P NEW MOD 45 MIN: CPT | Performed by: ORTHOPAEDIC SURGERY

## 2023-06-16 ENCOUNTER — TELEPHONE (OUTPATIENT)
Dept: ORTHOPEDICS | Age: 38
End: 2023-06-16

## 2023-07-12 ENCOUNTER — APPOINTMENT (OUTPATIENT)
Dept: LAB | Age: 38
End: 2023-07-12

## 2023-07-31 ENCOUNTER — IMAGING SERVICES (OUTPATIENT)
Dept: GENERAL RADIOLOGY | Age: 38
End: 2023-07-31
Attending: ORTHOPAEDIC SURGERY

## 2023-07-31 ENCOUNTER — OFFICE VISIT (OUTPATIENT)
Dept: ORTHOPEDICS | Age: 38
End: 2023-07-31

## 2023-07-31 DIAGNOSIS — S82.392D CLOSED FRACTURE OF POSTERIOR MALLEOLUS OF LEFT TIBIA WITH ROUTINE HEALING, SUBSEQUENT ENCOUNTER: Primary | ICD-10-CM

## 2023-07-31 DIAGNOSIS — S82.392D CLOSED FRACTURE OF POSTERIOR MALLEOLUS OF LEFT TIBIA WITH ROUTINE HEALING, SUBSEQUENT ENCOUNTER: ICD-10-CM

## 2023-07-31 PROCEDURE — 99213 OFFICE O/P EST LOW 20 MIN: CPT | Performed by: ORTHOPAEDIC SURGERY

## 2023-07-31 PROCEDURE — 73610 X-RAY EXAM OF ANKLE: CPT | Performed by: RADIOLOGY

## 2023-08-30 ENCOUNTER — IMAGING SERVICES (OUTPATIENT)
Dept: GENERAL RADIOLOGY | Age: 38
End: 2023-08-30
Attending: PHYSICIAN ASSISTANT

## 2023-08-30 ENCOUNTER — WALK IN (OUTPATIENT)
Dept: URGENT CARE | Age: 38
End: 2023-08-30

## 2023-08-30 ENCOUNTER — LAB SERVICES (OUTPATIENT)
Dept: LAB | Age: 38
End: 2023-08-30

## 2023-08-30 VITALS
DIASTOLIC BLOOD PRESSURE: 114 MMHG | BODY MASS INDEX: 52.32 KG/M2 | SYSTOLIC BLOOD PRESSURE: 159 MMHG | HEART RATE: 64 BPM | RESPIRATION RATE: 20 BRPM | WEIGHT: 286.05 LBS | OXYGEN SATURATION: 98 % | TEMPERATURE: 98 F

## 2023-08-30 DIAGNOSIS — R07.89 CHEST TIGHTNESS: ICD-10-CM

## 2023-08-30 DIAGNOSIS — R07.89 CHEST TIGHTNESS: Primary | ICD-10-CM

## 2023-08-30 LAB
ANION GAP SERPL CALC-SCNC: 10 MMOL/L (ref 7–19)
B-HCG UR QL: NEGATIVE
BASOPHILS # BLD: 0 K/MCL (ref 0–0.3)
BASOPHILS NFR BLD: 0 %
BUN SERPL-MCNC: 12 MG/DL (ref 6–20)
BUN/CREAT SERPL: 19 (ref 7–25)
CALCIUM SERPL-MCNC: 8.8 MG/DL (ref 8.4–10.2)
CHLORIDE SERPL-SCNC: 105 MMOL/L (ref 97–110)
CO2 SERPL-SCNC: 31 MMOL/L (ref 21–32)
CREAT SERPL-MCNC: 0.64 MG/DL (ref 0.51–0.95)
D DIMER PPP FEU-MCNC: 0.36 MG/L (FEU)
DEPRECATED RDW RBC: 44.2 FL (ref 39–50)
EGFRCR SERPLBLD CKD-EPI 2021: >90 ML/MIN/{1.73_M2}
EOSINOPHIL # BLD: 0.3 K/MCL (ref 0–0.5)
EOSINOPHIL NFR BLD: 3 %
ERYTHROCYTE [DISTWIDTH] IN BLOOD: 13.2 % (ref 11–15)
FASTING DURATION TIME PATIENT: NORMAL H
GLUCOSE SERPL-MCNC: 86 MG/DL (ref 70–99)
HCT VFR BLD CALC: 40.8 % (ref 36–46.5)
HGB BLD-MCNC: 12.9 G/DL (ref 12–15.5)
IMM GRANULOCYTES # BLD AUTO: 0 K/MCL (ref 0–0.2)
IMM GRANULOCYTES # BLD: 0 %
INTERNAL PROCEDURAL CONTROLS ACCEPTABLE: YES
LYMPHOCYTES # BLD: 4 K/MCL (ref 1–4.8)
LYMPHOCYTES NFR BLD: 42 %
MCH RBC QN AUTO: 28.5 PG (ref 26–34)
MCHC RBC AUTO-ENTMCNC: 31.6 G/DL (ref 32–36.5)
MCV RBC AUTO: 90.3 FL (ref 78–100)
MONOCYTES # BLD: 0.7 K/MCL (ref 0.3–0.9)
MONOCYTES NFR BLD: 8 %
NEUTROPHILS # BLD: 4.5 K/MCL (ref 1.8–7.7)
NEUTROPHILS NFR BLD: 47 %
PLATELET # BLD AUTO: 343 K/MCL (ref 140–450)
POTASSIUM SERPL-SCNC: 4.2 MMOL/L (ref 3.4–5.1)
RBC # BLD: 4.52 MIL/MCL (ref 4–5.2)
SODIUM SERPL-SCNC: 142 MMOL/L (ref 135–145)
TEST LOT EXPIRATION DATE: NORMAL
TEST LOT NUMBER: NORMAL
TROPONIN I SERPL DL<=0.01 NG/ML-MCNC: 4 NG/L
WBC # BLD: 9.6 K/MCL (ref 4.2–11)

## 2023-08-30 PROCEDURE — 71046 X-RAY EXAM CHEST 2 VIEWS: CPT | Performed by: STUDENT IN AN ORGANIZED HEALTH CARE EDUCATION/TRAINING PROGRAM

## 2023-08-30 PROCEDURE — 80048 BASIC METABOLIC PNL TOTAL CA: CPT | Performed by: INTERNAL MEDICINE

## 2023-08-30 PROCEDURE — 81025 URINE PREGNANCY TEST: CPT | Performed by: PHYSICIAN ASSISTANT

## 2023-08-30 PROCEDURE — 36415 COLL VENOUS BLD VENIPUNCTURE: CPT | Performed by: INTERNAL MEDICINE

## 2023-08-30 PROCEDURE — 84484 ASSAY OF TROPONIN QUANT: CPT | Performed by: INTERNAL MEDICINE

## 2023-08-30 PROCEDURE — 85379 FIBRIN DEGRADATION QUANT: CPT | Performed by: INTERNAL MEDICINE

## 2023-08-30 PROCEDURE — 99214 OFFICE O/P EST MOD 30 MIN: CPT | Performed by: PHYSICIAN ASSISTANT

## 2023-08-30 PROCEDURE — 85025 COMPLETE CBC W/AUTO DIFF WBC: CPT | Performed by: INTERNAL MEDICINE

## 2023-08-30 PROCEDURE — 93000 ELECTROCARDIOGRAM COMPLETE: CPT | Performed by: PHYSICIAN ASSISTANT

## 2023-10-07 ENCOUNTER — IMAGING SERVICES (OUTPATIENT)
Dept: GENERAL RADIOLOGY | Age: 38
End: 2023-10-07
Attending: FAMILY MEDICINE

## 2023-10-07 ENCOUNTER — TELEPHONE (OUTPATIENT)
Dept: URGENT CARE | Age: 38
End: 2023-10-07

## 2023-10-07 ENCOUNTER — WALK IN (OUTPATIENT)
Dept: URGENT CARE | Age: 38
End: 2023-10-07

## 2023-10-07 VITALS
HEART RATE: 90 BPM | TEMPERATURE: 97.4 F | OXYGEN SATURATION: 100 % | SYSTOLIC BLOOD PRESSURE: 122 MMHG | BODY MASS INDEX: 53.39 KG/M2 | DIASTOLIC BLOOD PRESSURE: 80 MMHG | WEIGHT: 291.89 LBS | RESPIRATION RATE: 18 BRPM

## 2023-10-07 DIAGNOSIS — M54.9 OTHER ACUTE BACK PAIN: Primary | ICD-10-CM

## 2023-10-07 DIAGNOSIS — M54.9 OTHER ACUTE BACK PAIN: ICD-10-CM

## 2023-10-07 LAB
APPEARANCE UR: CLEAR
BACTERIA #/AREA URNS HPF: ABNORMAL /HPF
BILIRUB UR QL STRIP: NEGATIVE
COLOR UR: YELLOW
GLUCOSE UR STRIP-MCNC: NEGATIVE MG/DL
HGB UR QL STRIP: ABNORMAL
HYALINE CASTS #/AREA URNS LPF: ABNORMAL /LPF
KETONES UR STRIP-MCNC: NEGATIVE MG/DL
LEUKOCYTE ESTERASE UR QL STRIP: NEGATIVE
NITRITE UR QL STRIP: NEGATIVE
PH UR STRIP: 5.5 [PH] (ref 5–7)
PROT UR STRIP-MCNC: NEGATIVE MG/DL
RBC #/AREA URNS HPF: ABNORMAL /HPF
SP GR UR STRIP: >=1.03 (ref 1–1.03)
SQUAMOUS #/AREA URNS HPF: ABNORMAL /HPF
UROBILINOGEN UR STRIP-MCNC: 0.2 MG/DL
WBC #/AREA URNS HPF: ABNORMAL /HPF

## 2023-10-07 PROCEDURE — 81001 URINALYSIS AUTO W/SCOPE: CPT | Performed by: INTERNAL MEDICINE

## 2023-10-07 PROCEDURE — 72100 X-RAY EXAM L-S SPINE 2/3 VWS: CPT | Performed by: RADIOLOGY

## 2023-10-07 PROCEDURE — 99214 OFFICE O/P EST MOD 30 MIN: CPT | Performed by: FAMILY MEDICINE

## 2023-10-07 RX ORDER — LISDEXAMFETAMINE DIMESYLATE 60 MG/1
60 CAPSULE ORAL DAILY
COMMUNITY
Start: 2023-07-14

## 2023-10-07 RX ORDER — VENLAFAXINE 75 MG/1
TABLET ORAL
COMMUNITY

## 2023-10-07 RX ORDER — NORETHINDRONE ACETATE AND ETHINYL ESTRADIOL AND FERROUS FUMARATE 5-7-9-7
KIT ORAL
COMMUNITY

## 2023-10-07 RX ORDER — TIZANIDINE 2 MG/1
2 TABLET ORAL EVERY 8 HOURS PRN
Qty: 15 TABLET | Refills: 0 | Status: SHIPPED | OUTPATIENT
Start: 2023-10-07

## 2023-10-11 ENCOUNTER — E-ADVICE (OUTPATIENT)
Dept: SPINE | Age: 38
End: 2023-10-11

## 2023-11-16 ENCOUNTER — OFFICE VISIT (OUTPATIENT)
Dept: ORTHOPEDICS | Age: 38
End: 2023-11-16

## 2023-11-16 ENCOUNTER — IMAGING SERVICES (OUTPATIENT)
Dept: GENERAL RADIOLOGY | Age: 38
End: 2023-11-16
Attending: ORTHOPAEDIC SURGERY

## 2023-11-16 DIAGNOSIS — S82.392D CLOSED FRACTURE OF POSTERIOR MALLEOLUS OF LEFT TIBIA WITH ROUTINE HEALING, SUBSEQUENT ENCOUNTER: ICD-10-CM

## 2023-11-16 DIAGNOSIS — S82.392D CLOSED FRACTURE OF POSTERIOR MALLEOLUS OF LEFT TIBIA WITH ROUTINE HEALING, SUBSEQUENT ENCOUNTER: Primary | ICD-10-CM

## 2023-11-16 DIAGNOSIS — E66.01 CLASS 3 SEVERE OBESITY WITH BODY MASS INDEX (BMI) OF 50.0 TO 59.9 IN ADULT, UNSPECIFIED OBESITY TYPE, UNSPECIFIED WHETHER SERIOUS COMORBIDITY PRESENT (CMD): ICD-10-CM

## 2023-11-16 DIAGNOSIS — M17.12 ARTHRITIS OF LEFT KNEE: ICD-10-CM

## 2023-11-16 PROCEDURE — 73610 X-RAY EXAM OF ANKLE: CPT | Performed by: RADIOLOGY

## 2023-11-16 PROCEDURE — 99213 OFFICE O/P EST LOW 20 MIN: CPT | Performed by: ORTHOPAEDIC SURGERY

## 2023-11-23 RX ORDER — NORETHINDRONE ACETATE AND ETHINYL ESTRADIOL AND FERROUS FUMARATE 5-7-9-7
1 KIT ORAL DAILY
Qty: 28 TABLET | Refills: 2 | Status: SHIPPED | OUTPATIENT
Start: 2023-11-23

## 2023-11-24 ENCOUNTER — E-VISIT (OUTPATIENT)
Dept: FAMILY MEDICINE | Age: 38
End: 2023-11-24

## 2023-11-24 DIAGNOSIS — Z76.0 ENCOUNTER FOR MEDICATION REFILL: Primary | ICD-10-CM

## 2023-11-24 PROCEDURE — 99422 OL DIG E/M SVC 11-20 MIN: CPT | Performed by: NURSE PRACTITIONER

## 2023-12-14 ENCOUNTER — TELEPHONE (OUTPATIENT)
Dept: INTEGRATIVE MEDICINE | Age: 38
End: 2023-12-14

## 2024-01-03 ENCOUNTER — APPOINTMENT (OUTPATIENT)
Dept: FAMILY MEDICINE | Age: 39
End: 2024-01-03

## 2024-01-03 ENCOUNTER — APPOINTMENT (OUTPATIENT)
Dept: OTHER | Age: 39
End: 2024-01-03

## 2024-01-13 ENCOUNTER — WALK IN (OUTPATIENT)
Dept: URGENT CARE | Age: 39
End: 2024-01-13

## 2024-01-13 VITALS
OXYGEN SATURATION: 97 % | TEMPERATURE: 99.6 F | RESPIRATION RATE: 20 BRPM | SYSTOLIC BLOOD PRESSURE: 132 MMHG | HEART RATE: 117 BPM | WEIGHT: 293 LBS | HEIGHT: 62 IN | BODY MASS INDEX: 53.92 KG/M2 | DIASTOLIC BLOOD PRESSURE: 92 MMHG

## 2024-01-13 DIAGNOSIS — Z28.311 COVID-19 VACCINE SERIES NOT COMPLETED: ICD-10-CM

## 2024-01-13 DIAGNOSIS — U07.1 LAB TEST POSITIVE FOR DETECTION OF COVID-19 VIRUS: Primary | ICD-10-CM

## 2024-01-13 DIAGNOSIS — Z28.9 COVID-19 VACCINE SERIES NOT COMPLETED: ICD-10-CM

## 2024-01-13 DIAGNOSIS — J02.9 PHARYNGITIS, UNSPECIFIED ETIOLOGY: ICD-10-CM

## 2024-01-13 DIAGNOSIS — J06.9 ACUTE URI: ICD-10-CM

## 2024-01-13 DIAGNOSIS — R52 BODY ACHES: ICD-10-CM

## 2024-01-13 DIAGNOSIS — R06.02 SOB (SHORTNESS OF BREATH): ICD-10-CM

## 2024-01-13 DIAGNOSIS — R50.9 FEVER, UNSPECIFIED FEVER CAUSE: ICD-10-CM

## 2024-01-13 LAB
FLUAV RNA RESP QL NAA+PROBE: NOT DETECTED
FLUBV RNA RESP QL NAA+PROBE: NOT DETECTED
RSV AG NPH QL IA.RAPID: NOT DETECTED
S PYO DNA THROAT QL NAA+PROBE: NOT DETECTED
SARS-COV-2 RNA RESP QL NAA+PROBE: DETECTED
SERVICE CMNT-IMP: ABNORMAL

## 2024-01-13 PROCEDURE — 87651 STREP A DNA AMP PROBE: CPT | Performed by: INTERNAL MEDICINE

## 2024-01-13 PROCEDURE — 99214 OFFICE O/P EST MOD 30 MIN: CPT | Performed by: FAMILY MEDICINE

## 2024-01-13 RX ORDER — MEDICAL SUPPLY, MISCELLANEOUS
60 EACH MISCELLANEOUS 3 TIMES DAILY
Qty: 1000 ML | Refills: 1 | Status: SHIPPED | OUTPATIENT
Start: 2024-01-13 | End: 2024-01-18

## 2024-01-13 RX ORDER — FLUTICASONE PROPIONATE 50 MCG
2 SPRAY, SUSPENSION (ML) NASAL DAILY
Qty: 1 EACH | Refills: 0 | Status: SHIPPED | OUTPATIENT
Start: 2024-01-13 | End: 2024-01-27

## 2024-01-13 RX ORDER — ALBUTEROL SULFATE 90 UG/1
2 AEROSOL, METERED RESPIRATORY (INHALATION) EVERY 6 HOURS
Qty: 1 EACH | Refills: 0 | Status: SHIPPED | OUTPATIENT
Start: 2024-01-13 | End: 2024-02-12

## 2024-01-13 RX ORDER — DEXAMETHASONE SODIUM PHOSPHATE 4 MG/ML
12 INJECTION, SOLUTION INTRA-ARTICULAR; INTRALESIONAL; INTRAMUSCULAR; INTRAVENOUS; SOFT TISSUE ONCE
Status: COMPLETED | OUTPATIENT
Start: 2024-01-13 | End: 2024-01-13

## 2024-01-13 RX ORDER — GUAIFENESIN 600 MG/1
1200 TABLET, EXTENDED RELEASE ORAL 2 TIMES DAILY
Qty: 120 TABLET | Refills: 0 | Status: SHIPPED | OUTPATIENT
Start: 2024-01-13 | End: 2024-02-12

## 2024-01-13 RX ORDER — ACETAMINOPHEN 160 MG/5ML
650 SUSPENSION ORAL ONCE
Status: COMPLETED | OUTPATIENT
Start: 2024-01-13 | End: 2024-01-13

## 2024-01-13 RX ORDER — CEFUROXIME AXETIL 250 MG/1
250 TABLET ORAL 2 TIMES DAILY
Qty: 20 TABLET | Refills: 0 | Status: SHIPPED | OUTPATIENT
Start: 2024-01-13 | End: 2024-01-15 | Stop reason: ALTCHOICE

## 2024-01-13 RX ADMIN — ACETAMINOPHEN 650 MG: 160 SUSPENSION ORAL at 16:10

## 2024-01-13 RX ADMIN — DEXAMETHASONE SODIUM PHOSPHATE 12 MG: 4 INJECTION, SOLUTION INTRA-ARTICULAR; INTRALESIONAL; INTRAMUSCULAR; INTRAVENOUS; SOFT TISSUE at 16:10

## 2024-01-15 ENCOUNTER — TELEPHONE (OUTPATIENT)
Dept: URGENT CARE | Age: 39
End: 2024-01-15

## 2024-01-15 ENCOUNTER — WALK IN (OUTPATIENT)
Dept: URGENT CARE | Age: 39
End: 2024-01-15

## 2024-01-15 VITALS
WEIGHT: 293 LBS | RESPIRATION RATE: 20 BRPM | OXYGEN SATURATION: 97 % | HEART RATE: 117 BPM | HEIGHT: 62 IN | TEMPERATURE: 99.6 F | BODY MASS INDEX: 53.92 KG/M2

## 2024-01-15 VITALS
SYSTOLIC BLOOD PRESSURE: 118 MMHG | WEIGHT: 293 LBS | TEMPERATURE: 98.8 F | OXYGEN SATURATION: 98 % | DIASTOLIC BLOOD PRESSURE: 84 MMHG | HEART RATE: 80 BPM | RESPIRATION RATE: 16 BRPM | BODY MASS INDEX: 53.59 KG/M2

## 2024-01-15 DIAGNOSIS — T78.40XA ALLERGIC REACTION TO DRUG, INITIAL ENCOUNTER: Primary | ICD-10-CM

## 2024-01-15 PROCEDURE — 99214 OFFICE O/P EST MOD 30 MIN: CPT | Performed by: FAMILY MEDICINE

## 2024-01-15 RX ORDER — PREDNISONE 10 MG/1
TABLET ORAL
Qty: 30 TABLET | Refills: 0 | Status: SHIPPED | OUTPATIENT
Start: 2024-01-15

## 2024-01-15 ASSESSMENT — ENCOUNTER SYMPTOMS
NAUSEA: 0
DIZZINESS: 0
VOICE CHANGE: 0
STRIDOR: 0
COUGH: 0
CHEST TIGHTNESS: 0
LIGHT-HEADEDNESS: 0
WHEEZING: 0
ABDOMINAL PAIN: 0
TROUBLE SWALLOWING: 0
SHORTNESS OF BREATH: 0

## 2024-02-26 ENCOUNTER — LAB SERVICES (OUTPATIENT)
Dept: LAB | Age: 39
End: 2024-02-26

## 2024-02-26 ENCOUNTER — APPOINTMENT (OUTPATIENT)
Dept: FAMILY MEDICINE | Age: 39
End: 2024-02-26

## 2024-02-26 VITALS
SYSTOLIC BLOOD PRESSURE: 132 MMHG | HEIGHT: 62 IN | BODY MASS INDEX: 53.92 KG/M2 | DIASTOLIC BLOOD PRESSURE: 76 MMHG | OXYGEN SATURATION: 100 % | HEART RATE: 73 BPM | WEIGHT: 293 LBS

## 2024-02-26 DIAGNOSIS — F98.8 ADD (ATTENTION DEFICIT DISORDER) WITHOUT HYPERACTIVITY: ICD-10-CM

## 2024-02-26 DIAGNOSIS — Z76.89 ENCOUNTER TO ESTABLISH CARE: Primary | ICD-10-CM

## 2024-02-26 DIAGNOSIS — G90.A POTS (POSTURAL ORTHOSTATIC TACHYCARDIA SYNDROME): ICD-10-CM

## 2024-02-26 DIAGNOSIS — L30.4 INTERTRIGO: ICD-10-CM

## 2024-02-26 DIAGNOSIS — G43.109 OCULAR MIGRAINE: ICD-10-CM

## 2024-02-26 DIAGNOSIS — E66.01 OBESITY, MORBID, BMI 50 OR HIGHER (CMD): ICD-10-CM

## 2024-02-26 PROBLEM — R87.619 ABNORMAL CERVICAL PAPANICOLAOU SMEAR: Status: ACTIVE | Noted: 2019-07-26

## 2024-02-26 PROBLEM — N87.9 DYSPLASIA OF CERVIX: Status: ACTIVE | Noted: 2024-02-26

## 2024-02-26 PROBLEM — K80.12 CALCULUS OF GALLBLADDER WITH ACUTE ON CHRONIC CHOLECYSTITIS WITHOUT OBSTRUCTION: Status: ACTIVE | Noted: 2024-02-26

## 2024-02-26 PROBLEM — R76.8 RHEUMATOID FACTOR POSITIVE: Status: ACTIVE | Noted: 2024-02-26

## 2024-02-26 PROBLEM — G89.29 CHRONIC NECK PAIN: Status: ACTIVE | Noted: 2024-02-26

## 2024-02-26 PROBLEM — M54.2 CHRONIC NECK PAIN: Status: ACTIVE | Noted: 2024-02-26

## 2024-02-26 PROBLEM — E78.6 LIPOPROTEIN DEFICIENCY DISORDER: Status: ACTIVE | Noted: 2024-02-26

## 2024-02-26 PROBLEM — F31.81 BIPOLAR 2 DISORDER (CMD): Status: ACTIVE | Noted: 2021-07-12

## 2024-02-26 PROBLEM — L25.9 CONTACT DERMATITIS OF HAND: Status: ACTIVE | Noted: 2024-02-26

## 2024-02-26 PROBLEM — F39 MOOD DISORDER (CMD): Status: ACTIVE | Noted: 2024-02-26

## 2024-02-26 PROBLEM — M79.7 FIBROMYALGIA: Status: ACTIVE | Noted: 2021-07-12

## 2024-02-26 PROBLEM — G60.9 IDIOPATHIC PERIPHERAL NEUROPATHY: Status: ACTIVE | Noted: 2024-02-26

## 2024-02-26 PROBLEM — J30.9 ALLERGIC RHINITIS: Status: ACTIVE | Noted: 2021-07-16

## 2024-02-26 LAB
ALBUMIN SERPL-MCNC: 3.3 G/DL (ref 3.6–5.1)
ALBUMIN/GLOB SERPL: 1 {RATIO} (ref 1–2.4)
ALP SERPL-CCNC: 92 UNITS/L (ref 45–117)
ALT SERPL-CCNC: 24 UNITS/L
ANION GAP SERPL CALC-SCNC: 15 MMOL/L (ref 7–19)
AST SERPL-CCNC: 14 UNITS/L
BILIRUB SERPL-MCNC: 0.5 MG/DL (ref 0.2–1)
BUN SERPL-MCNC: 8 MG/DL (ref 6–20)
BUN/CREAT SERPL: 13 (ref 7–25)
CALCIUM SERPL-MCNC: 8.9 MG/DL (ref 8.4–10.2)
CHLORIDE SERPL-SCNC: 104 MMOL/L (ref 97–110)
CO2 SERPL-SCNC: 25 MMOL/L (ref 21–32)
CREAT SERPL-MCNC: 0.63 MG/DL (ref 0.51–0.95)
EGFRCR SERPLBLD CKD-EPI 2021: >90 ML/MIN/{1.73_M2}
FASTING DURATION TIME PATIENT: 1 HOURS (ref 0–999)
GLOBULIN SER-MCNC: 3.2 G/DL (ref 2–4)
GLUCOSE SERPL-MCNC: 83 MG/DL (ref 70–99)
POTASSIUM SERPL-SCNC: 4.3 MMOL/L (ref 3.4–5.1)
PROT SERPL-MCNC: 6.5 G/DL (ref 6.4–8.2)
SODIUM SERPL-SCNC: 140 MMOL/L (ref 135–145)

## 2024-02-26 PROCEDURE — 83036 HEMOGLOBIN GLYCOSYLATED A1C: CPT | Performed by: CLINICAL MEDICAL LABORATORY

## 2024-02-26 PROCEDURE — 80053 COMPREHEN METABOLIC PANEL: CPT | Performed by: INTERNAL MEDICINE

## 2024-02-26 PROCEDURE — 99215 OFFICE O/P EST HI 40 MIN: CPT | Performed by: STUDENT IN AN ORGANIZED HEALTH CARE EDUCATION/TRAINING PROGRAM

## 2024-02-26 PROCEDURE — G2211 COMPLEX E/M VISIT ADD ON: HCPCS | Performed by: STUDENT IN AN ORGANIZED HEALTH CARE EDUCATION/TRAINING PROGRAM

## 2024-02-26 PROCEDURE — 80307 DRUG TEST PRSMV CHEM ANLYZR: CPT | Performed by: CLINICAL MEDICAL LABORATORY

## 2024-02-26 PROCEDURE — 80061 LIPID PANEL: CPT | Performed by: CLINICAL MEDICAL LABORATORY

## 2024-02-26 PROCEDURE — 84443 ASSAY THYROID STIM HORMONE: CPT | Performed by: CLINICAL MEDICAL LABORATORY

## 2024-02-26 PROCEDURE — 36415 COLL VENOUS BLD VENIPUNCTURE: CPT | Performed by: INTERNAL MEDICINE

## 2024-02-26 RX ORDER — LISDEXAMFETAMINE DIMESYLATE CAPSULES 40 MG/1
CAPSULE ORAL
COMMUNITY
End: 2024-02-26 | Stop reason: SDUPTHER

## 2024-02-26 RX ORDER — LISDEXAMFETAMINE DIMESYLATE CAPSULES 60 MG/1
60 CAPSULE ORAL EVERY MORNING
Qty: 30 CAPSULE | Refills: 0 | Status: SHIPPED | OUTPATIENT
Start: 2024-02-26

## 2024-02-26 RX ORDER — KETOCONAZOLE 20 MG/G
1 CREAM TOPICAL DAILY
Qty: 30 G | Refills: 1 | Status: SHIPPED | OUTPATIENT
Start: 2024-02-26

## 2024-02-26 RX ORDER — METOPROLOL SUCCINATE 100 MG/1
100 TABLET, EXTENDED RELEASE ORAL DAILY
Qty: 90 TABLET | Refills: 1 | Status: SHIPPED | OUTPATIENT
Start: 2024-02-26

## 2024-02-26 ASSESSMENT — PATIENT HEALTH QUESTIONNAIRE - PHQ9
1. LITTLE INTEREST OR PLEASURE IN DOING THINGS: SEVERAL DAYS
SUM OF ALL RESPONSES TO PHQ9 QUESTIONS 1 AND 2: 2
2. FEELING DOWN, DEPRESSED OR HOPELESS: SEVERAL DAYS
CLINICAL INTERPRETATION OF PHQ2 SCORE: NO FURTHER SCREENING NEEDED
SUM OF ALL RESPONSES TO PHQ9 QUESTIONS 1 AND 2: 2

## 2024-02-27 LAB
AMPHETAMINES UR QL SCN>500 NG/ML: NEGATIVE
BARBITURATES UR QL SCN>200 NG/ML: NEGATIVE
BENZODIAZ UR QL SCN>200 NG/ML: NEGATIVE
BZE UR QL SCN>150 NG/ML: NEGATIVE
CANNABINOIDS UR QL SCN>50 NG/ML: NEGATIVE
CHOLEST SERPL-MCNC: 186 MG/DL
CHOLEST/HDLC SERPL: 3.2 {RATIO}
ETHANOL UR-MCNC: NEGATIVE MG/DL
FENTANYL UR QL SCN: NEGATIVE
HBA1C MFR BLD: 5.7 % (ref 4.5–5.6)
HDLC SERPL-MCNC: 59 MG/DL
LDLC SERPL CALC-MCNC: 111 MG/DL
METHADONE UR QL SCN>300 NG/ML: NEGATIVE
NONHDLC SERPL-MCNC: 127 MG/DL
OPIATES UR QL SCN>300 NG/ML: NEGATIVE
PCP UR QL SCN>25 NG/ML: NEGATIVE
TRIGL SERPL-MCNC: 79 MG/DL
TSH SERPL-ACNC: 1.45 MCUNITS/ML (ref 0.35–5)

## 2024-03-25 ENCOUNTER — APPOINTMENT (OUTPATIENT)
Dept: INTERNAL MEDICINE | Age: 39
End: 2024-03-25

## 2024-04-02 DIAGNOSIS — F98.8 ADD (ATTENTION DEFICIT DISORDER) WITHOUT HYPERACTIVITY: ICD-10-CM

## 2024-04-03 RX ORDER — LISDEXAMFETAMINE DIMESYLATE 60 MG/1
60 CAPSULE ORAL EVERY MORNING
Qty: 30 CAPSULE | Refills: 0 | Status: SHIPPED | OUTPATIENT
Start: 2024-04-03 | End: 2024-05-21 | Stop reason: SDUPTHER

## 2024-04-14 ENCOUNTER — WALK IN (OUTPATIENT)
Dept: URGENT CARE | Age: 39
End: 2024-04-14

## 2024-04-14 VITALS
OXYGEN SATURATION: 99 % | TEMPERATURE: 98.1 F | SYSTOLIC BLOOD PRESSURE: 118 MMHG | DIASTOLIC BLOOD PRESSURE: 90 MMHG | BODY MASS INDEX: 53.66 KG/M2 | RESPIRATION RATE: 16 BRPM | HEART RATE: 95 BPM | WEIGHT: 293 LBS

## 2024-04-14 DIAGNOSIS — H65.112 ACUTE MUCOID OTITIS MEDIA OF LEFT EAR: Primary | ICD-10-CM

## 2024-04-14 PROCEDURE — 99213 OFFICE O/P EST LOW 20 MIN: CPT | Performed by: FAMILY MEDICINE

## 2024-04-14 RX ORDER — ALBUTEROL SULFATE 90 UG/1
AEROSOL, METERED RESPIRATORY (INHALATION) PRN
COMMUNITY

## 2024-04-14 RX ORDER — AMOXICILLIN 875 MG/1
875 TABLET, COATED ORAL 2 TIMES DAILY
Qty: 20 TABLET | Refills: 0 | Status: SHIPPED | OUTPATIENT
Start: 2024-04-14 | End: 2024-04-25

## 2024-04-23 ENCOUNTER — OFFICE VISIT (OUTPATIENT)
Dept: BARIATRICS/WEIGHT MGMT | Age: 39
End: 2024-04-23
Attending: STUDENT IN AN ORGANIZED HEALTH CARE EDUCATION/TRAINING PROGRAM

## 2024-04-23 VITALS
RESPIRATION RATE: 16 BRPM | BODY MASS INDEX: 53.55 KG/M2 | OXYGEN SATURATION: 97 % | HEIGHT: 62 IN | DIASTOLIC BLOOD PRESSURE: 81 MMHG | SYSTOLIC BLOOD PRESSURE: 131 MMHG | HEART RATE: 66 BPM | WEIGHT: 291.01 LBS

## 2024-04-23 DIAGNOSIS — Z76.89 ENCOUNTER FOR WEIGHT MANAGEMENT: ICD-10-CM

## 2024-04-23 DIAGNOSIS — E66.01 CLASS 3 SEVERE OBESITY WITH BODY MASS INDEX (BMI) OF 50.0 TO 59.9 IN ADULT, UNSPECIFIED OBESITY TYPE, UNSPECIFIED WHETHER SERIOUS COMORBIDITY PRESENT (CMD): Primary | ICD-10-CM

## 2024-04-23 PROCEDURE — 99205 OFFICE O/P NEW HI 60 MIN: CPT | Performed by: NURSE PRACTITIONER

## 2024-04-30 ENCOUNTER — TELEPHONE (OUTPATIENT)
Dept: ORTHOPEDICS | Age: 39
End: 2024-04-30

## 2024-05-06 ENCOUNTER — APPOINTMENT (OUTPATIENT)
Dept: CARDIOLOGY | Age: 39
End: 2024-05-06
Attending: STUDENT IN AN ORGANIZED HEALTH CARE EDUCATION/TRAINING PROGRAM

## 2024-05-15 ENCOUNTER — TELEPHONE (OUTPATIENT)
Dept: BARIATRICS/WEIGHT MGMT | Age: 39
End: 2024-05-15

## 2024-05-21 ENCOUNTER — APPOINTMENT (OUTPATIENT)
Dept: BARIATRICS/WEIGHT MGMT | Age: 39
End: 2024-05-21

## 2024-05-23 RX ORDER — ONDANSETRON HYDROCHLORIDE 8 MG/1
8 TABLET, FILM COATED ORAL EVERY 8 HOURS PRN
Qty: 20 TABLET | Refills: 1 | Status: CANCELLED | OUTPATIENT
Start: 2024-05-23

## 2024-05-29 ENCOUNTER — APPOINTMENT (OUTPATIENT)
Dept: ORTHOPEDICS | Age: 39
End: 2024-05-29

## 2024-05-29 ENCOUNTER — E-ADVICE (OUTPATIENT)
Dept: FAMILY MEDICINE | Age: 39
End: 2024-05-29

## 2024-05-29 ENCOUNTER — LAB SERVICES (OUTPATIENT)
Dept: LAB | Age: 39
End: 2024-05-29

## 2024-05-29 DIAGNOSIS — E66.01 CLASS 3 SEVERE OBESITY WITH BODY MASS INDEX (BMI) OF 50.0 TO 59.9 IN ADULT, UNSPECIFIED OBESITY TYPE, UNSPECIFIED WHETHER SERIOUS COMORBIDITY PRESENT  (CMD): ICD-10-CM

## 2024-05-29 DIAGNOSIS — Z76.89 ENCOUNTER FOR WEIGHT MANAGEMENT: ICD-10-CM

## 2024-05-29 LAB
DEPRECATED RDW RBC: 40.4 FL (ref 39–50)
ERYTHROCYTE [DISTWIDTH] IN BLOOD: 12.9 % (ref 11–15)
HCT VFR BLD CALC: 42.5 % (ref 36–46.5)
HGB BLD-MCNC: 13.7 G/DL (ref 12–15.5)
MCH RBC QN AUTO: 27.5 PG (ref 26–34)
MCHC RBC AUTO-ENTMCNC: 32.2 G/DL (ref 32–36.5)
MCV RBC AUTO: 85.3 FL (ref 78–100)
PLATELET # BLD AUTO: 399 K/MCL (ref 140–450)
RBC # BLD: 4.98 MIL/MCL (ref 4–5.2)
WBC # BLD: 11.4 K/MCL (ref 4.2–11)

## 2024-05-29 PROCEDURE — 82306 VITAMIN D 25 HYDROXY: CPT | Performed by: CLINICAL MEDICAL LABORATORY

## 2024-05-29 PROCEDURE — 36415 COLL VENOUS BLD VENIPUNCTURE: CPT | Performed by: INTERNAL MEDICINE

## 2024-05-29 PROCEDURE — 85027 COMPLETE CBC AUTOMATED: CPT | Performed by: INTERNAL MEDICINE

## 2024-05-29 PROCEDURE — 83525 ASSAY OF INSULIN: CPT | Performed by: CLINICAL MEDICAL LABORATORY

## 2024-05-30 ENCOUNTER — TELEPHONE (OUTPATIENT)
Dept: BARIATRICS/WEIGHT MGMT | Age: 39
End: 2024-05-30

## 2024-05-30 DIAGNOSIS — E55.9 VITAMIN D INSUFFICIENCY: Primary | ICD-10-CM

## 2024-05-30 LAB
25(OH)D3+25(OH)D2 SERPL-MCNC: 23.6 NG/ML (ref 30–100)
FASTING DURATION TIME PATIENT: NORMAL H
INSULIN P FAST SERPL-ACNC: 21 MUNITS/L (ref 3–28)

## 2024-05-30 RX ORDER — ONDANSETRON HYDROCHLORIDE 8 MG/1
8 TABLET, FILM COATED ORAL EVERY 8 HOURS PRN
Qty: 20 TABLET | Refills: 1 | Status: SHIPPED | OUTPATIENT
Start: 2024-05-30

## 2024-05-31 ENCOUNTER — E-ADVICE (OUTPATIENT)
Dept: EDUCATION SERVICES | Age: 39
End: 2024-05-31

## 2024-06-03 ENCOUNTER — APPOINTMENT (OUTPATIENT)
Dept: BARIATRICS/WEIGHT MGMT | Age: 39
End: 2024-06-03

## 2024-06-04 ENCOUNTER — APPOINTMENT (OUTPATIENT)
Dept: FAMILY MEDICINE | Age: 39
End: 2024-06-04

## 2024-06-17 ENCOUNTER — E-ADVICE (OUTPATIENT)
Dept: BARIATRICS/WEIGHT MGMT | Age: 39
End: 2024-06-17

## 2024-06-18 ENCOUNTER — CLINICAL DOCUMENTATION (OUTPATIENT)
Dept: BARIATRICS/WEIGHT MGMT | Age: 39
End: 2024-06-18

## 2024-06-18 ENCOUNTER — APPOINTMENT (OUTPATIENT)
Dept: BARIATRICS/WEIGHT MGMT | Age: 39
End: 2024-06-18

## 2024-08-23 ENCOUNTER — APPOINTMENT (OUTPATIENT)
Dept: URGENT CARE | Age: 39
End: 2024-08-23

## 2024-09-21 ENCOUNTER — HOSPITAL ENCOUNTER (OUTPATIENT)
Age: 39
Setting detail: OBSERVATION
DRG: 603 | End: 2024-09-21
Admitting: INTERNAL MEDICINE

## 2024-09-21 ENCOUNTER — APPOINTMENT (OUTPATIENT)
Dept: CT IMAGING | Age: 39
DRG: 603 | End: 2024-09-21

## 2024-09-21 ENCOUNTER — WALK IN (OUTPATIENT)
Dept: URGENT CARE | Age: 39
End: 2024-09-21

## 2024-09-21 VITALS
WEIGHT: 290 LBS | HEART RATE: 70 BPM | RESPIRATION RATE: 16 BRPM | SYSTOLIC BLOOD PRESSURE: 118 MMHG | DIASTOLIC BLOOD PRESSURE: 62 MMHG | HEIGHT: 62 IN | TEMPERATURE: 98.5 F | BODY MASS INDEX: 53.37 KG/M2 | OXYGEN SATURATION: 99 %

## 2024-09-21 DIAGNOSIS — L73.2 HYDRADENITIS: ICD-10-CM

## 2024-09-21 DIAGNOSIS — L03.90 CELLULITIS, UNSPECIFIED CELLULITIS SITE: Primary | ICD-10-CM

## 2024-09-21 DIAGNOSIS — R21 RASH: Primary | ICD-10-CM

## 2024-09-21 LAB
ALBUMIN SERPL-MCNC: 3 G/DL (ref 3.4–5)
ALP SERPL-CCNC: 157 UNITS/L (ref 45–117)
ALT SERPL-CCNC: 68 UNITS/L
ANION GAP BLD CALC-SCNC: 18 MMOL/L (ref 7–19)
ANION GAP SERPL CALC-SCNC: 12 MMOL/L (ref 7–19)
AST SERPL-CCNC: 61 UNITS/L
ATRIAL RATE (BPM): 95
BACTERIA BLD CULT: NORMAL
BACTERIA BLD CULT: NORMAL
BASOPHILS # BLD: 0 K/MCL (ref 0–0.3)
BASOPHILS NFR BLD: 0 %
BILIRUB CONJ SERPL-MCNC: <0.1 MG/DL (ref 0–0.2)
BILIRUB SERPL-MCNC: 0.6 MG/DL (ref 0.2–1)
BUN BLD-MCNC: 7 MG/DL (ref 6–20)
BUN SERPL-MCNC: 7 MG/DL (ref 6–20)
BUN/CREAT SERPL: 12 (ref 7–25)
CA-I BLD-SCNC: 1.11 MMOL/L (ref 1.15–1.29)
CALCIUM SERPL-MCNC: 9.5 MG/DL (ref 8.4–10.2)
CHLORIDE BLD-SCNC: 101 MMOL/L (ref 97–110)
CHLORIDE SERPL-SCNC: 104 MMOL/L (ref 97–110)
CO2 BLD-SCNC: 21 MMOL/L (ref 19–24)
CO2 SERPL-SCNC: 23 MMOL/L (ref 21–32)
CREAT SERPL-MCNC: 0.6 MG/DL (ref 0.51–0.95)
CREAT SERPL-MCNC: 0.6 MG/DL (ref 0.51–0.95)
DEPRECATED RDW RBC: 39.8 FL (ref 39–50)
EGFRCR SERPLBLD CKD-EPI 2021: >90 ML/MIN/{1.73_M2}
EGFRCR SERPLBLD CKD-EPI 2021: >90 ML/MIN/{1.73_M2}
EOSINOPHIL # BLD: 0 K/MCL (ref 0–0.5)
EOSINOPHIL NFR BLD: 0 %
ERYTHROCYTE [DISTWIDTH] IN BLOOD: 12.7 % (ref 11–15)
FASTING DURATION TIME PATIENT: ABNORMAL H
GLUCOSE BLD-MCNC: 104 MG/DL (ref 70–99)
GLUCOSE SERPL-MCNC: 101 MG/DL (ref 70–99)
HCG SERPL-ACNC: <2 MUNITS/ML
HCT VFR BLD CALC: 40.1 % (ref 36–46.5)
HCT VFR BLD CALC: 43 % (ref 36–46.5)
HGB BLD CALC-MCNC: 14.6 G/DL (ref 12–15.5)
HGB BLD-MCNC: 13.1 G/DL (ref 12–15.5)
IMM GRANULOCYTES # BLD AUTO: 0.1 K/MCL (ref 0–0.2)
IMM GRANULOCYTES # BLD: 1 %
LACTATE BLDV-SCNC: 1 MMOL/L
LYMPHOCYTES # BLD: 3.2 K/MCL (ref 1–4.8)
LYMPHOCYTES NFR BLD: 21 %
MCH RBC QN AUTO: 27.8 PG (ref 26–34)
MCHC RBC AUTO-ENTMCNC: 32.7 G/DL (ref 32–36.5)
MCV RBC AUTO: 85.1 FL (ref 78–100)
MONOCYTES # BLD: 0.9 K/MCL (ref 0.3–0.9)
MONOCYTES NFR BLD: 6 %
NEUTROPHILS # BLD: 11 K/MCL (ref 1.8–7.7)
NEUTROPHILS NFR BLD: 72 %
NRBC BLD MANUAL-RTO: 0 /100 WBC
P AXIS (DEGREES): 70
PLATELET # BLD AUTO: 423 K/MCL (ref 140–450)
POTASSIUM BLD-SCNC: 3.3 MMOL/L (ref 3.4–5.1)
POTASSIUM SERPL-SCNC: 3.7 MMOL/L (ref 3.4–5.1)
PR-INTERVAL (MSEC): 156
PROT SERPL-MCNC: 8.2 G/DL (ref 6.4–8.2)
QRS-INTERVAL (MSEC): 74
QT-INTERVAL (MSEC): 320
QTC: 402
R AXIS (DEGREES): 69
RBC # BLD: 4.71 MIL/MCL (ref 4–5.2)
REPORT TEXT: NORMAL
SODIUM BLD-SCNC: 136 MMOL/L (ref 135–145)
SODIUM SERPL-SCNC: 135 MMOL/L (ref 135–145)
T AXIS (DEGREES): 32
VENTRICULAR RATE EKG/MIN (BPM): 95
WBC # BLD: 15.2 K/MCL (ref 4.2–11)

## 2024-09-21 PROCEDURE — 10002801 HB RX 250 W/O HCPCS

## 2024-09-21 PROCEDURE — 84702 CHORIONIC GONADOTROPIN TEST: CPT

## 2024-09-21 PROCEDURE — 10002803 HB RX 637: Performed by: INTERNAL MEDICINE

## 2024-09-21 PROCEDURE — 87040 BLOOD CULTURE FOR BACTERIA: CPT | Performed by: EMERGENCY MEDICINE

## 2024-09-21 PROCEDURE — 10002807 HB RX 258

## 2024-09-21 PROCEDURE — G0378 HOSPITAL OBSERVATION PER HR: HCPCS

## 2024-09-21 PROCEDURE — 96372 THER/PROPH/DIAG INJ SC/IM: CPT | Performed by: INTERNAL MEDICINE

## 2024-09-21 PROCEDURE — 74177 CT ABD & PELVIS W/CONTRAST: CPT | Performed by: RADIOLOGY

## 2024-09-21 PROCEDURE — 93010 ELECTROCARDIOGRAM REPORT: CPT | Performed by: INTERNAL MEDICINE

## 2024-09-21 PROCEDURE — 85014 HEMATOCRIT: CPT

## 2024-09-21 PROCEDURE — 10002800 HB RX 250 W HCPCS

## 2024-09-21 PROCEDURE — 96365 THER/PROPH/DIAG IV INF INIT: CPT

## 2024-09-21 PROCEDURE — 10002805 HB CONTRAST AGENT

## 2024-09-21 PROCEDURE — 74177 CT ABD & PELVIS W/CONTRAST: CPT

## 2024-09-21 PROCEDURE — 83605 ASSAY OF LACTIC ACID: CPT

## 2024-09-21 PROCEDURE — 10002800 HB RX 250 W HCPCS: Performed by: INTERNAL MEDICINE

## 2024-09-21 PROCEDURE — 96375 TX/PRO/DX INJ NEW DRUG ADDON: CPT

## 2024-09-21 PROCEDURE — 99284 EMERGENCY DEPT VISIT MOD MDM: CPT

## 2024-09-21 PROCEDURE — 36415 COLL VENOUS BLD VENIPUNCTURE: CPT

## 2024-09-21 PROCEDURE — 80048 BASIC METABOLIC PNL TOTAL CA: CPT

## 2024-09-21 PROCEDURE — 86592 SYPHILIS TEST NON-TREP QUAL: CPT | Performed by: INTERNAL MEDICINE

## 2024-09-21 PROCEDURE — 85025 COMPLETE CBC W/AUTO DIFF WBC: CPT

## 2024-09-21 PROCEDURE — 10004651 HB RX, NO CHARGE ITEM: Performed by: INTERNAL MEDICINE

## 2024-09-21 PROCEDURE — 10002807 HB RX 258: Performed by: INTERNAL MEDICINE

## 2024-09-21 PROCEDURE — 36415 COLL VENOUS BLD VENIPUNCTURE: CPT | Performed by: EMERGENCY MEDICINE

## 2024-09-21 PROCEDURE — 87389 HIV-1 AG W/HIV-1&-2 AB AG IA: CPT | Performed by: INTERNAL MEDICINE

## 2024-09-21 PROCEDURE — 80076 HEPATIC FUNCTION PANEL: CPT

## 2024-09-21 PROCEDURE — 93005 ELECTROCARDIOGRAM TRACING: CPT | Performed by: INTERNAL MEDICINE

## 2024-09-21 PROCEDURE — 96361 HYDRATE IV INFUSION ADD-ON: CPT

## 2024-09-21 RX ORDER — LISDEXAMFETAMINE DIMESYLATE 30 MG/1
60 TABLET, CHEWABLE ORAL DAILY
Status: DISCONTINUED | OUTPATIENT
Start: 2024-09-22 | End: 2024-09-25 | Stop reason: HOSPADM

## 2024-09-21 RX ORDER — AMOXICILLIN 250 MG
2 CAPSULE ORAL 2 TIMES DAILY PRN
Status: DISCONTINUED | OUTPATIENT
Start: 2024-09-21 | End: 2024-09-25 | Stop reason: HOSPADM

## 2024-09-21 RX ORDER — 0.9 % SODIUM CHLORIDE 0.9 %
2 VIAL (ML) INJECTION EVERY 12 HOURS SCHEDULED
Status: DISCONTINUED | OUTPATIENT
Start: 2024-09-21 | End: 2024-09-25 | Stop reason: HOSPADM

## 2024-09-21 RX ORDER — ALBUTEROL SULFATE 0.83 MG/ML
2.5 SOLUTION RESPIRATORY (INHALATION)
Status: DISCONTINUED | OUTPATIENT
Start: 2024-09-21 | End: 2024-09-25 | Stop reason: HOSPADM

## 2024-09-21 RX ORDER — ONDANSETRON 2 MG/ML
4 INJECTION INTRAMUSCULAR; INTRAVENOUS EVERY 12 HOURS PRN
Status: DISCONTINUED | OUTPATIENT
Start: 2024-09-21 | End: 2024-09-25 | Stop reason: HOSPADM

## 2024-09-21 RX ORDER — METOPROLOL SUCCINATE 100 MG/1
100 TABLET, EXTENDED RELEASE ORAL DAILY
Status: DISCONTINUED | OUTPATIENT
Start: 2024-09-22 | End: 2024-09-25 | Stop reason: HOSPADM

## 2024-09-21 RX ORDER — ALBUTEROL SULFATE 90 UG/1
2 INHALANT RESPIRATORY (INHALATION)
Status: DISCONTINUED | OUTPATIENT
Start: 2024-09-21 | End: 2024-09-25 | Stop reason: HOSPADM

## 2024-09-21 RX ORDER — ONDANSETRON 4 MG/1
4 TABLET, ORALLY DISINTEGRATING ORAL EVERY 12 HOURS PRN
Status: DISCONTINUED | OUTPATIENT
Start: 2024-09-21 | End: 2024-09-25 | Stop reason: HOSPADM

## 2024-09-21 RX ORDER — ENOXAPARIN SODIUM 100 MG/ML
40 INJECTION SUBCUTANEOUS EVERY 12 HOURS SCHEDULED
Status: DISCONTINUED | OUTPATIENT
Start: 2024-09-21 | End: 2024-09-25 | Stop reason: HOSPADM

## 2024-09-21 RX ORDER — LANOLIN ALCOHOL/MO/W.PET/CERES
3 CREAM (GRAM) TOPICAL NIGHTLY PRN
Status: DISCONTINUED | OUTPATIENT
Start: 2024-09-21 | End: 2024-09-25 | Stop reason: HOSPADM

## 2024-09-21 RX ORDER — POLYETHYLENE GLYCOL 3350 17 G/17G
17 POWDER, FOR SOLUTION ORAL DAILY PRN
Status: DISCONTINUED | OUTPATIENT
Start: 2024-09-21 | End: 2024-09-25 | Stop reason: HOSPADM

## 2024-09-21 RX ORDER — BISACODYL 10 MG
10 SUPPOSITORY, RECTAL RECTAL DAILY PRN
Status: DISCONTINUED | OUTPATIENT
Start: 2024-09-21 | End: 2024-09-25 | Stop reason: HOSPADM

## 2024-09-21 RX ORDER — ACETAMINOPHEN 650 MG/1
650 SUPPOSITORY RECTAL EVERY 4 HOURS PRN
Status: DISCONTINUED | OUTPATIENT
Start: 2024-09-21 | End: 2024-09-25 | Stop reason: HOSPADM

## 2024-09-21 RX ORDER — GABAPENTIN 100 MG/1
100 CAPSULE ORAL ONCE
Status: COMPLETED | OUTPATIENT
Start: 2024-09-21 | End: 2024-09-21

## 2024-09-21 RX ORDER — KETOROLAC TROMETHAMINE 15 MG/ML
15 INJECTION, SOLUTION INTRAMUSCULAR; INTRAVENOUS EVERY 6 HOURS PRN
Status: DISCONTINUED | OUTPATIENT
Start: 2024-09-21 | End: 2024-09-25 | Stop reason: HOSPADM

## 2024-09-21 RX ORDER — SODIUM CHLORIDE, SODIUM LACTATE, POTASSIUM CHLORIDE, CALCIUM CHLORIDE 600; 310; 30; 20 MG/100ML; MG/100ML; MG/100ML; MG/100ML
INJECTION, SOLUTION INTRAVENOUS CONTINUOUS
Status: ACTIVE | OUTPATIENT
Start: 2024-09-21 | End: 2024-09-22

## 2024-09-21 RX ORDER — ACETAMINOPHEN 325 MG/1
650 TABLET ORAL EVERY 4 HOURS PRN
Status: DISCONTINUED | OUTPATIENT
Start: 2024-09-21 | End: 2024-09-25 | Stop reason: HOSPADM

## 2024-09-21 RX ORDER — OXYCODONE HYDROCHLORIDE 5 MG/1
10 TABLET ORAL EVERY 4 HOURS PRN
Status: DISCONTINUED | OUTPATIENT
Start: 2024-09-21 | End: 2024-09-25 | Stop reason: HOSPADM

## 2024-09-21 RX ORDER — FLUOXETINE 10 MG/1
20 CAPSULE ORAL DAILY
Status: ON HOLD | COMMUNITY
End: 2024-09-25

## 2024-09-21 RX ADMIN — SODIUM CHLORIDE, POTASSIUM CHLORIDE, SODIUM LACTATE AND CALCIUM CHLORIDE: 600; 310; 30; 20 INJECTION, SOLUTION INTRAVENOUS at 22:58

## 2024-09-21 RX ADMIN — SODIUM CHLORIDE, POTASSIUM CHLORIDE, SODIUM LACTATE AND CALCIUM CHLORIDE 1000 ML: 600; 310; 30; 20 INJECTION, SOLUTION INTRAVENOUS at 18:12

## 2024-09-21 RX ADMIN — CEFAZOLIN SODIUM 2000 MG: 300 INJECTION, POWDER, LYOPHILIZED, FOR SOLUTION INTRAVENOUS at 19:23

## 2024-09-21 RX ADMIN — ACYCLOVIR SODIUM 250 MG: 50 INJECTION, SOLUTION INTRAVENOUS at 23:31

## 2024-09-21 RX ADMIN — IOHEXOL 100 ML: 300 INJECTION, SOLUTION INTRAVENOUS at 18:47

## 2024-09-21 RX ADMIN — ACETAMINOPHEN 650 MG: 325 TABLET ORAL at 22:54

## 2024-09-21 RX ADMIN — VANCOMYCIN HYDROCHLORIDE 2000 MG: 10 INJECTION, POWDER, LYOPHILIZED, FOR SOLUTION INTRAVENOUS at 19:25

## 2024-09-21 RX ADMIN — SODIUM CHLORIDE 100 ML: 9 INJECTION, SOLUTION INTRAVENOUS at 18:47

## 2024-09-21 RX ADMIN — CEFAZOLIN SODIUM 2000 MG: 300 INJECTION, POWDER, LYOPHILIZED, FOR SOLUTION INTRAVENOUS at 22:54

## 2024-09-21 RX ADMIN — GABAPENTIN 100 MG: 100 CAPSULE ORAL at 22:54

## 2024-09-21 RX ADMIN — SODIUM CHLORIDE, PRESERVATIVE FREE 2 ML: 5 INJECTION INTRAVENOUS at 22:55

## 2024-09-21 RX ADMIN — ONDANSETRON 4 MG: 4 TABLET, ORALLY DISINTEGRATING ORAL at 22:54

## 2024-09-21 SDOH — ECONOMIC STABILITY: TRANSPORTATION INSECURITY
IN THE PAST 12 MONTHS, HAS LACK OF RELIABLE TRANSPORTATION KEPT YOU FROM MEDICAL APPOINTMENTS, MEETINGS, WORK OR FROM GETTING THINGS NEEDED FOR DAILY LIVING?: NO

## 2024-09-21 SDOH — ECONOMIC STABILITY: HOUSING INSECURITY: WHAT IS YOUR LIVING SITUATION TODAY?: I HAVE A STEADY PLACE TO LIVE

## 2024-09-21 SDOH — SOCIAL STABILITY: SOCIAL INSECURITY: HOW OFTEN DOES ANYONE, INCLUDING FAMILY AND FRIENDS, PHYSICALLY HURT YOU?: NEVER

## 2024-09-21 SDOH — ECONOMIC STABILITY: HOUSING INSECURITY: WHAT IS YOUR LIVING SITUATION TODAY?: HOUSE

## 2024-09-21 SDOH — HEALTH STABILITY: GENERAL
BECAUSE OF A PHYSICAL, MENTAL, OR EMOTIONAL CONDITION, DO YOU HAVE SERIOUS DIFFICULTY CONCENTRATING, REMEMBERING OR MAKING DECISIONS?: NO

## 2024-09-21 SDOH — HEALTH STABILITY: PHYSICAL HEALTH: DO YOU HAVE DIFFICULTY DRESSING OR BATHING?: NO

## 2024-09-21 SDOH — HEALTH STABILITY: PHYSICAL HEALTH: DO YOU HAVE SERIOUS DIFFICULTY WALKING OR CLIMBING STAIRS?: NO

## 2024-09-21 SDOH — ECONOMIC STABILITY: GENERAL

## 2024-09-21 SDOH — SOCIAL STABILITY: SOCIAL INSECURITY: HOW OFTEN DOES ANYONE, INCLUDING FAMILY AND FRIENDS, SCREAM OR CURSE AT YOU?: NEVER

## 2024-09-21 SDOH — HEALTH STABILITY: GENERAL: BECAUSE OF A PHYSICAL, MENTAL, OR EMOTIONAL CONDITION, DO YOU HAVE DIFFICULTY DOING ERRANDS ALONE?: NO

## 2024-09-21 SDOH — ECONOMIC STABILITY: INCOME INSECURITY: IN THE PAST 12 MONTHS, HAS THE ELECTRIC, GAS, OIL, OR WATER COMPANY THREATENED TO SHUT OFF SERVICE IN YOUR HOME?: NO

## 2024-09-21 SDOH — ECONOMIC STABILITY: FOOD INSECURITY: WITHIN THE PAST 12 MONTHS, THE FOOD YOU BOUGHT JUST DIDN'T LAST AND YOU DIDN'T HAVE MONEY TO GET MORE.: NEVER TRUE

## 2024-09-21 SDOH — SOCIAL STABILITY: SOCIAL INSECURITY: HOW OFTEN DOES ANYONE, INCLUDING FAMILY AND FRIENDS, THREATEN YOU WITH HARM?: NEVER

## 2024-09-21 SDOH — SOCIAL STABILITY: SOCIAL INSECURITY: HOW OFTEN DOES ANYONE, INCLUDING FAMILY AND FRIENDS, INSULT OR TALK DOWN TO YOU?: NEVER

## 2024-09-21 SDOH — ECONOMIC STABILITY: HOUSING INSECURITY: WHAT IS YOUR LIVING SITUATION TODAY?: CHILDREN;OTHER (COMMENTS)

## 2024-09-21 SDOH — SOCIAL STABILITY: SOCIAL NETWORK
HOW OFTEN DO YOU SEE OR TALK TO PEOPLE THAT YOU CARE ABOUT AND FEEL CLOSE TO? (FOR EXAMPLE: TALKING TO FRIENDS ON THE PHONE, VISITING FRIENDS OR FAMILY, GOING TO CHURCH OR CLUB MEETINGS): 5 OR MORE TIMES A WEEK

## 2024-09-21 SDOH — ECONOMIC STABILITY: HOUSING INSECURITY: DO YOU HAVE PROBLEMS WITH ANY OF THE FOLLOWING?: NONE OF THE ABOVE

## 2024-09-21 SDOH — SOCIAL STABILITY: SOCIAL NETWORK: SUPPORT SYSTEMS: CHILDREN;FAMILY MEMBERS

## 2024-09-21 SDOH — ECONOMIC STABILITY: GENERAL: WOULD YOU LIKE HELP WITH ANY OF THE FOLLOWING NEEDS?: I DON'T WANT HELP WITH ANY OF THESE

## 2024-09-21 ASSESSMENT — LIFESTYLE VARIABLES
HOW OFTEN DO YOU HAVE 6 OR MORE DRINKS ON ONE OCCASION: NEVER
HOW MANY STANDARD DRINKS CONTAINING ALCOHOL DO YOU HAVE ON A TYPICAL DAY: 0,1 OR 2
AUDIT-C TOTAL SCORE: 1
HOW OFTEN DO YOU HAVE A DRINK CONTAINING ALCOHOL: MONTHLY OR LESS
ALCOHOL_USE_STATUS: NO OR LOW RISK WITH VALIDATED TOOL

## 2024-09-21 ASSESSMENT — PATIENT HEALTH QUESTIONNAIRE - PHQ9
SUM OF ALL RESPONSES TO PHQ9 QUESTIONS 1 AND 2: 2
SUM OF ALL RESPONSES TO PHQ9 QUESTIONS 1 AND 2: 2
CLINICAL INTERPRETATION OF PHQ2 SCORE: NO FURTHER SCREENING NEEDED
IS PATIENT ABLE TO COMPLETE PHQ2 OR PHQ9: YES
1. LITTLE INTEREST OR PLEASURE IN DOING THINGS: SEVERAL DAYS
2. FEELING DOWN, DEPRESSED OR HOPELESS: SEVERAL DAYS

## 2024-09-21 ASSESSMENT — ACTIVITIES OF DAILY LIVING (ADL)
ADL_BEFORE_ADMISSION: INDEPENDENT
ADL_SHORT_OF_BREATH: NO
ADL_SCORE: 12
RECENT_DECLINE_ADL: NO

## 2024-09-21 ASSESSMENT — COLUMBIA-SUICIDE SEVERITY RATING SCALE - C-SSRS
1. WITHIN THE PAST MONTH, HAVE YOU WISHED YOU WERE DEAD OR WISHED YOU COULD GO TO SLEEP AND NOT WAKE UP?: NO
IS THE PATIENT ABLE TO COMPLETE C-SSRS: YES
2. HAVE YOU ACTUALLY HAD ANY THOUGHTS OF KILLING YOURSELF?: NO
6. HAVE YOU EVER DONE ANYTHING, STARTED TO DO ANYTHING, OR PREPARED TO DO ANYTHING TO END YOUR LIFE?: NO

## 2024-09-21 ASSESSMENT — PAIN SCALES - GENERAL
PAINLEVEL_OUTOF10: 3
PAINLEVEL_OUTOF10: 6

## 2024-09-21 ASSESSMENT — PAIN DESCRIPTION - PAIN TYPE: TYPE: ACUTE PAIN

## 2024-09-22 ENCOUNTER — APPOINTMENT (OUTPATIENT)
Dept: CT IMAGING | Age: 39
DRG: 603 | End: 2024-09-22
Attending: HOSPITALIST

## 2024-09-22 VITALS
HEART RATE: 95 BPM | TEMPERATURE: 99.3 F | HEIGHT: 62 IN | BODY MASS INDEX: 53.53 KG/M2 | SYSTOLIC BLOOD PRESSURE: 110 MMHG | OXYGEN SATURATION: 95 % | WEIGHT: 290.9 LBS | RESPIRATION RATE: 18 BRPM | DIASTOLIC BLOOD PRESSURE: 67 MMHG

## 2024-09-22 PROBLEM — L03.90 CELLULITIS: Status: ACTIVE | Noted: 2024-09-22

## 2024-09-22 LAB
ALBUMIN SERPL-MCNC: 2.4 G/DL (ref 3.4–5)
ALBUMIN/GLOB SERPL: 0.6 {RATIO} (ref 1–2.4)
ALP SERPL-CCNC: 137 UNITS/L (ref 45–117)
ALT SERPL-CCNC: 48 UNITS/L
ANION GAP SERPL CALC-SCNC: 11 MMOL/L (ref 7–19)
APPEARANCE UR: CLEAR
AST SERPL-CCNC: 32 UNITS/L
ATRIAL RATE (BPM): 95
BACTERIA #/AREA URNS HPF: ABNORMAL /HPF
BASOPHILS # BLD: 0 K/MCL (ref 0–0.3)
BASOPHILS NFR BLD: 0 %
BILIRUB SERPL-MCNC: 0.3 MG/DL (ref 0.2–1)
BILIRUB UR QL STRIP: NEGATIVE
BUN SERPL-MCNC: 5 MG/DL (ref 6–20)
BUN/CREAT SERPL: 8 (ref 7–25)
CALCIUM SERPL-MCNC: 8.2 MG/DL (ref 8.4–10.2)
CHLORIDE SERPL-SCNC: 102 MMOL/L (ref 97–110)
CO2 SERPL-SCNC: 25 MMOL/L (ref 21–32)
COLOR UR: YELLOW
CREAT SERPL-MCNC: 0.6 MG/DL (ref 0.51–0.95)
DEPRECATED RDW RBC: 39.9 FL (ref 39–50)
EGFRCR SERPLBLD CKD-EPI 2021: >90 ML/MIN/{1.73_M2}
EOSINOPHIL # BLD: 0.1 K/MCL (ref 0–0.5)
EOSINOPHIL NFR BLD: 0 %
ERYTHROCYTE [DISTWIDTH] IN BLOOD: 12.8 % (ref 11–15)
FASTING DURATION TIME PATIENT: ABNORMAL H
GLOBULIN SER-MCNC: 4.3 G/DL (ref 2–4)
GLUCOSE SERPL-MCNC: 114 MG/DL (ref 70–99)
GLUCOSE UR STRIP-MCNC: NEGATIVE MG/DL
HCT VFR BLD CALC: 35.7 % (ref 36–46.5)
HGB BLD-MCNC: 11.3 G/DL (ref 12–15.5)
HGB UR QL STRIP: NEGATIVE
HIV 1+2 AB+HIV1 P24 AG SERPL QL IA: NONREACTIVE
HYALINE CASTS #/AREA URNS LPF: ABNORMAL /LPF
IMM GRANULOCYTES # BLD AUTO: 0 K/MCL (ref 0–0.2)
IMM GRANULOCYTES # BLD: 0 %
KETONES UR STRIP-MCNC: 15 MG/DL
LEUKOCYTE ESTERASE UR QL STRIP: ABNORMAL
LYMPHOCYTES # BLD: 2.5 K/MCL (ref 1–4.8)
LYMPHOCYTES NFR BLD: 20 %
MCH RBC QN AUTO: 27.2 PG (ref 26–34)
MCHC RBC AUTO-ENTMCNC: 31.7 G/DL (ref 32–36.5)
MCV RBC AUTO: 85.8 FL (ref 78–100)
MONOCYTES # BLD: 1 K/MCL (ref 0.3–0.9)
MONOCYTES NFR BLD: 8 %
MRSA DNA SPEC QL NAA+PROBE: DETECTED
MUCOUS THREADS URNS QL MICRO: PRESENT
NEUTROPHILS # BLD: 8.6 K/MCL (ref 1.8–7.7)
NEUTROPHILS NFR BLD: 72 %
NITRITE UR QL STRIP: NEGATIVE
NRBC BLD MANUAL-RTO: 0 /100 WBC
P AXIS (DEGREES): 70
PH UR STRIP: 6 [PH] (ref 5–7)
PLATELET # BLD AUTO: 374 K/MCL (ref 140–450)
POTASSIUM SERPL-SCNC: 2.9 MMOL/L (ref 3.4–5.1)
POTASSIUM SERPL-SCNC: 3.2 MMOL/L (ref 3.4–5.1)
PR-INTERVAL (MSEC): 156
PROT SERPL-MCNC: 6.7 G/DL (ref 6.4–8.2)
PROT UR STRIP-MCNC: ABNORMAL MG/DL
QRS-INTERVAL (MSEC): 74
QT-INTERVAL (MSEC): 370
QTC: 466
R AXIS (DEGREES): 69
RAINBOW EXTRA TUBES HOLD SPECIMEN: NORMAL
RBC # BLD: 4.16 MIL/MCL (ref 4–5.2)
RBC #/AREA URNS HPF: ABNORMAL /HPF
REPORT TEXT: NORMAL
RPR SER QL: NONREACTIVE
SODIUM SERPL-SCNC: 135 MMOL/L (ref 135–145)
SP GR UR STRIP: >1.03 (ref 1–1.03)
SQUAMOUS #/AREA URNS HPF: ABNORMAL /HPF
T AXIS (DEGREES): 32
UROBILINOGEN UR STRIP-MCNC: 0.2 MG/DL
VENTRICULAR RATE EKG/MIN (BPM): 95
WBC # BLD: 12.1 K/MCL (ref 4.2–11)
WBC #/AREA URNS HPF: ABNORMAL /HPF

## 2024-09-22 PROCEDURE — 84132 ASSAY OF SERUM POTASSIUM: CPT | Performed by: HOSPITALIST

## 2024-09-22 PROCEDURE — 80053 COMPREHEN METABOLIC PANEL: CPT | Performed by: INTERNAL MEDICINE

## 2024-09-22 PROCEDURE — 10002807 HB RX 258: Performed by: INTERNAL MEDICINE

## 2024-09-22 PROCEDURE — 73700 CT LOWER EXTREMITY W/O DYE: CPT | Performed by: RADIOLOGY

## 2024-09-22 PROCEDURE — 10002803 HB RX 637: Performed by: INTERNAL MEDICINE

## 2024-09-22 PROCEDURE — 87641 MR-STAPH DNA AMP PROBE: CPT | Performed by: INTERNAL MEDICINE

## 2024-09-22 PROCEDURE — 10004651 HB RX, NO CHARGE ITEM: Performed by: INTERNAL MEDICINE

## 2024-09-22 PROCEDURE — 81001 URINALYSIS AUTO W/SCOPE: CPT | Performed by: INTERNAL MEDICINE

## 2024-09-22 PROCEDURE — 10002800 HB RX 250 W HCPCS: Performed by: INTERNAL MEDICINE

## 2024-09-22 PROCEDURE — 85025 COMPLETE CBC W/AUTO DIFF WBC: CPT | Performed by: INTERNAL MEDICINE

## 2024-09-22 PROCEDURE — 10000002 HB ROOM CHARGE MED SURG

## 2024-09-22 PROCEDURE — 87205 SMEAR GRAM STAIN: CPT | Performed by: INTERNAL MEDICINE

## 2024-09-22 PROCEDURE — 87529 HSV DNA AMP PROBE: CPT | Performed by: INTERNAL MEDICINE

## 2024-09-22 PROCEDURE — 96372 THER/PROPH/DIAG INJ SC/IM: CPT | Performed by: INTERNAL MEDICINE

## 2024-09-22 PROCEDURE — 99232 SBSQ HOSP IP/OBS MODERATE 35: CPT | Performed by: HOSPITALIST

## 2024-09-22 PROCEDURE — 36415 COLL VENOUS BLD VENIPUNCTURE: CPT | Performed by: INTERNAL MEDICINE

## 2024-09-22 PROCEDURE — 10002803 HB RX 637: Performed by: HOSPITALIST

## 2024-09-22 PROCEDURE — 87086 URINE CULTURE/COLONY COUNT: CPT | Performed by: INTERNAL MEDICINE

## 2024-09-22 PROCEDURE — 73700 CT LOWER EXTREMITY W/O DYE: CPT

## 2024-09-22 PROCEDURE — G0378 HOSPITAL OBSERVATION PER HR: HCPCS

## 2024-09-22 RX ORDER — POTASSIUM CHLORIDE 1500 MG/1
40 TABLET, EXTENDED RELEASE ORAL ONCE
Status: COMPLETED | OUTPATIENT
Start: 2024-09-22 | End: 2024-09-22

## 2024-09-22 RX ORDER — POTASSIUM CHLORIDE 1500 MG/1
40 TABLET, EXTENDED RELEASE ORAL ONCE
Status: DISCONTINUED | OUTPATIENT
Start: 2024-09-22 | End: 2024-09-22

## 2024-09-22 RX ADMIN — ONDANSETRON 4 MG: 4 TABLET, ORALLY DISINTEGRATING ORAL at 16:40

## 2024-09-22 RX ADMIN — VANCOMYCIN HYDROCHLORIDE 1000 MG: 1 INJECTION, POWDER, LYOPHILIZED, FOR SOLUTION INTRAVENOUS at 09:12

## 2024-09-22 RX ADMIN — FLUOXETINE HYDROCHLORIDE 20 MG: 20 CAPSULE ORAL at 09:09

## 2024-09-22 RX ADMIN — ENOXAPARIN SODIUM 40 MG: 100 INJECTION SUBCUTANEOUS at 09:08

## 2024-09-22 RX ADMIN — CEFAZOLIN SODIUM 2000 MG: 300 INJECTION, POWDER, LYOPHILIZED, FOR SOLUTION INTRAVENOUS at 05:05

## 2024-09-22 RX ADMIN — VANCOMYCIN HYDROCHLORIDE 1000 MG: 1 INJECTION, POWDER, LYOPHILIZED, FOR SOLUTION INTRAVENOUS at 21:00

## 2024-09-22 RX ADMIN — LISDEXAMFETAMINE DIMESYLATE 60 MG: 30 TABLET, CHEWABLE ORAL at 09:10

## 2024-09-22 RX ADMIN — ONDANSETRON 4 MG: 4 TABLET, ORALLY DISINTEGRATING ORAL at 16:39

## 2024-09-22 RX ADMIN — ENOXAPARIN SODIUM 40 MG: 100 INJECTION SUBCUTANEOUS at 20:59

## 2024-09-22 RX ADMIN — SODIUM CHLORIDE, PRESERVATIVE FREE 2 ML: 5 INJECTION INTRAVENOUS at 09:20

## 2024-09-22 RX ADMIN — METOPROLOL SUCCINATE 100 MG: 100 TABLET, EXTENDED RELEASE ORAL at 09:09

## 2024-09-22 RX ADMIN — POTASSIUM CHLORIDE 40 MEQ: 1500 TABLET, EXTENDED RELEASE ORAL at 20:59

## 2024-09-22 RX ADMIN — ACYCLOVIR SODIUM 250 MG: 50 INJECTION, SOLUTION INTRAVENOUS at 05:05

## 2024-09-22 RX ADMIN — POTASSIUM CHLORIDE 40 MEQ: 1500 TABLET, EXTENDED RELEASE ORAL at 09:10

## 2024-09-22 RX ADMIN — SODIUM CHLORIDE, PRESERVATIVE FREE 2 ML: 5 INJECTION INTRAVENOUS at 20:59

## 2024-09-22 ASSESSMENT — PAIN SCALES - GENERAL
PAINLEVEL_OUTOF10: 3
PAINLEVEL_OUTOF10: 3

## 2024-09-23 LAB
ALBUMIN SERPL-MCNC: 2.3 G/DL (ref 3.4–5)
ALBUMIN/GLOB SERPL: 0.6 {RATIO} (ref 1–2.4)
ALP SERPL-CCNC: 111 UNITS/L (ref 45–117)
ALT SERPL-CCNC: 34 UNITS/L
ANION GAP SERPL CALC-SCNC: 9 MMOL/L (ref 7–19)
AST SERPL-CCNC: 19 UNITS/L
BACTERIA UR CULT: NO GROWTH
BASOPHILS # BLD: 0 K/MCL (ref 0–0.3)
BASOPHILS NFR BLD: 1 %
BILIRUB SERPL-MCNC: 0.2 MG/DL (ref 0.2–1)
BUN SERPL-MCNC: 5 MG/DL (ref 6–20)
BUN/CREAT SERPL: 9 (ref 7–25)
CALCIUM SERPL-MCNC: 8.5 MG/DL (ref 8.4–10.2)
CHLORIDE SERPL-SCNC: 107 MMOL/L (ref 97–110)
CO2 SERPL-SCNC: 26 MMOL/L (ref 21–32)
CREAT SERPL-MCNC: 0.58 MG/DL (ref 0.51–0.95)
DEPRECATED RDW RBC: 41.1 FL (ref 39–50)
EGFRCR SERPLBLD CKD-EPI 2021: >90 ML/MIN/{1.73_M2}
EOSINOPHIL # BLD: 0.1 K/MCL (ref 0–0.5)
EOSINOPHIL NFR BLD: 2 %
ERYTHROCYTE [DISTWIDTH] IN BLOOD: 12.9 % (ref 11–15)
FASTING DURATION TIME PATIENT: ABNORMAL H
GLOBULIN SER-MCNC: 4.1 G/DL (ref 2–4)
GLUCOSE SERPL-MCNC: 90 MG/DL (ref 70–99)
HCT VFR BLD CALC: 38 % (ref 36–46.5)
HGB BLD-MCNC: 12.2 G/DL (ref 12–15.5)
HSV1 DNA SPEC QL NAA+PROBE: NOT DETECTED
HSV2 DNA SPEC QL NAA+PROBE: NOT DETECTED
IMM GRANULOCYTES # BLD AUTO: 0 K/MCL (ref 0–0.2)
IMM GRANULOCYTES # BLD: 0 %
LYMPHOCYTES # BLD: 2.9 K/MCL (ref 1–4.8)
LYMPHOCYTES NFR BLD: 33 %
MCH RBC QN AUTO: 27.9 PG (ref 26–34)
MCHC RBC AUTO-ENTMCNC: 32.1 G/DL (ref 32–36.5)
MCV RBC AUTO: 86.8 FL (ref 78–100)
MONOCYTES # BLD: 0.6 K/MCL (ref 0.3–0.9)
MONOCYTES NFR BLD: 7 %
NEUTROPHILS # BLD: 5 K/MCL (ref 1.8–7.7)
NEUTROPHILS NFR BLD: 57 %
NRBC BLD MANUAL-RTO: 0 /100 WBC
PLATELET # BLD AUTO: 423 K/MCL (ref 140–450)
POTASSIUM SERPL-SCNC: 3.5 MMOL/L (ref 3.4–5.1)
POTASSIUM SERPL-SCNC: 3.6 MMOL/L (ref 3.4–5.1)
PROT SERPL-MCNC: 6.4 G/DL (ref 6.4–8.2)
RAINBOW EXTRA TUBES HOLD SPECIMEN: NORMAL
RBC # BLD: 4.38 MIL/MCL (ref 4–5.2)
SERVICE CMNT-IMP: NORMAL
SODIUM SERPL-SCNC: 138 MMOL/L (ref 135–145)
WBC # BLD: 8.7 K/MCL (ref 4.2–11)

## 2024-09-23 PROCEDURE — 36415 COLL VENOUS BLD VENIPUNCTURE: CPT | Performed by: HOSPITALIST

## 2024-09-23 PROCEDURE — 10000002 HB ROOM CHARGE MED SURG

## 2024-09-23 PROCEDURE — 10002803 HB RX 637: Performed by: INTERNAL MEDICINE

## 2024-09-23 PROCEDURE — 84132 ASSAY OF SERUM POTASSIUM: CPT | Performed by: HOSPITALIST

## 2024-09-23 PROCEDURE — 10002803 HB RX 637: Performed by: HOSPITALIST

## 2024-09-23 PROCEDURE — 10003445 HB TELEMETRY PER DAY

## 2024-09-23 PROCEDURE — 10004651 HB RX, NO CHARGE ITEM: Performed by: INTERNAL MEDICINE

## 2024-09-23 PROCEDURE — 10002800 HB RX 250 W HCPCS: Performed by: INTERNAL MEDICINE

## 2024-09-23 PROCEDURE — 99232 SBSQ HOSP IP/OBS MODERATE 35: CPT | Performed by: HOSPITALIST

## 2024-09-23 PROCEDURE — 10002803 HB RX 637: Performed by: NURSE PRACTITIONER

## 2024-09-23 PROCEDURE — 10002807 HB RX 258: Performed by: INTERNAL MEDICINE

## 2024-09-23 PROCEDURE — 85025 COMPLETE CBC W/AUTO DIFF WBC: CPT | Performed by: INTERNAL MEDICINE

## 2024-09-23 PROCEDURE — 80053 COMPREHEN METABOLIC PANEL: CPT | Performed by: INTERNAL MEDICINE

## 2024-09-23 PROCEDURE — 96372 THER/PROPH/DIAG INJ SC/IM: CPT | Performed by: INTERNAL MEDICINE

## 2024-09-23 PROCEDURE — 99222 1ST HOSP IP/OBS MODERATE 55: CPT | Performed by: NURSE PRACTITIONER

## 2024-09-23 RX ORDER — POTASSIUM CHLORIDE 1500 MG/1
40 TABLET, EXTENDED RELEASE ORAL ONCE
Status: COMPLETED | OUTPATIENT
Start: 2024-09-23 | End: 2024-09-23

## 2024-09-23 RX ORDER — MUPIROCIN 20 MG/G
OINTMENT TOPICAL PRN
Status: DISCONTINUED | OUTPATIENT
Start: 2024-09-23 | End: 2024-09-25 | Stop reason: HOSPADM

## 2024-09-23 RX ORDER — LINEZOLID 600 MG/1
600 TABLET, FILM COATED ORAL 2 TIMES DAILY
Qty: 20 TABLET | Refills: 0 | Status: SHIPPED | OUTPATIENT
Start: 2024-09-23 | End: 2024-10-05

## 2024-09-23 RX ORDER — MUPIROCIN 20 MG/G
OINTMENT TOPICAL DAILY
Status: DISCONTINUED | OUTPATIENT
Start: 2024-09-23 | End: 2024-09-25 | Stop reason: HOSPADM

## 2024-09-23 RX ADMIN — ENOXAPARIN SODIUM 40 MG: 100 INJECTION SUBCUTANEOUS at 09:08

## 2024-09-23 RX ADMIN — SODIUM CHLORIDE, PRESERVATIVE FREE 2 ML: 5 INJECTION INTRAVENOUS at 09:09

## 2024-09-23 RX ADMIN — LISDEXAMFETAMINE DIMESYLATE 60 MG: 30 TABLET, CHEWABLE ORAL at 09:17

## 2024-09-23 RX ADMIN — FLUOXETINE HYDROCHLORIDE 20 MG: 20 CAPSULE ORAL at 09:08

## 2024-09-23 RX ADMIN — METOPROLOL SUCCINATE 100 MG: 100 TABLET, EXTENDED RELEASE ORAL at 09:08

## 2024-09-23 RX ADMIN — POTASSIUM CHLORIDE 40 MEQ: 1500 TABLET, EXTENDED RELEASE ORAL at 11:49

## 2024-09-23 RX ADMIN — VANCOMYCIN HYDROCHLORIDE 1000 MG: 1 INJECTION, POWDER, LYOPHILIZED, FOR SOLUTION INTRAVENOUS at 17:08

## 2024-09-23 ASSESSMENT — PAIN SCALES - GENERAL: PAINLEVEL_OUTOF10: 0

## 2024-09-24 LAB
RAINBOW EXTRA TUBES HOLD SPECIMEN: NORMAL
VANCOMYCIN TROUGH SERPL-MCNC: 4.7 MCG/ML (ref 10–20)

## 2024-09-24 PROCEDURE — 10002801 HB RX 250 W/O HCPCS: Performed by: INTERNAL MEDICINE

## 2024-09-24 PROCEDURE — 36415 COLL VENOUS BLD VENIPUNCTURE: CPT | Performed by: INTERNAL MEDICINE

## 2024-09-24 PROCEDURE — 10002803 HB RX 637: Performed by: INTERNAL MEDICINE

## 2024-09-24 PROCEDURE — 10004651 HB RX, NO CHARGE ITEM: Performed by: INTERNAL MEDICINE

## 2024-09-24 PROCEDURE — 99232 SBSQ HOSP IP/OBS MODERATE 35: CPT | Performed by: HOSPITALIST

## 2024-09-24 PROCEDURE — 80202 ASSAY OF VANCOMYCIN: CPT | Performed by: INTERNAL MEDICINE

## 2024-09-24 PROCEDURE — 10000002 HB ROOM CHARGE MED SURG

## 2024-09-24 PROCEDURE — 10002800 HB RX 250 W HCPCS: Performed by: INTERNAL MEDICINE

## 2024-09-24 PROCEDURE — 10002807 HB RX 258: Performed by: INTERNAL MEDICINE

## 2024-09-24 RX ADMIN — SODIUM CHLORIDE, PRESERVATIVE FREE 2 ML: 5 INJECTION INTRAVENOUS at 08:11

## 2024-09-24 RX ADMIN — VANCOMYCIN HYDROCHLORIDE 1000 MG: 1 INJECTION, POWDER, LYOPHILIZED, FOR SOLUTION INTRAVENOUS at 06:49

## 2024-09-24 RX ADMIN — METOPROLOL SUCCINATE 100 MG: 100 TABLET, EXTENDED RELEASE ORAL at 08:10

## 2024-09-24 RX ADMIN — MUPIROCIN: 20 OINTMENT TOPICAL at 22:23

## 2024-09-24 RX ADMIN — VANCOMYCIN HYDROCHLORIDE 1500 MG: 10 INJECTION, POWDER, LYOPHILIZED, FOR SOLUTION INTRAVENOUS at 17:44

## 2024-09-24 RX ADMIN — FLUOXETINE HYDROCHLORIDE 20 MG: 20 CAPSULE ORAL at 08:10

## 2024-09-24 RX ADMIN — LISDEXAMFETAMINE DIMESYLATE 60 MG: 30 TABLET, CHEWABLE ORAL at 08:09

## 2024-09-24 ASSESSMENT — PAIN SCALES - GENERAL
PAINLEVEL_OUTOF10: 0
PAINLEVEL_OUTOF10: 0

## 2024-09-25 VITALS
SYSTOLIC BLOOD PRESSURE: 131 MMHG | RESPIRATION RATE: 16 BRPM | OXYGEN SATURATION: 98 % | WEIGHT: 293 LBS | DIASTOLIC BLOOD PRESSURE: 95 MMHG | TEMPERATURE: 97.4 F | HEART RATE: 60 BPM | HEIGHT: 62 IN | BODY MASS INDEX: 53.92 KG/M2

## 2024-09-25 LAB
CREAT SERPL-MCNC: 0.59 MG/DL (ref 0.51–0.95)
EGFRCR SERPLBLD CKD-EPI 2021: >90 ML/MIN/{1.73_M2}
RAINBOW EXTRA TUBES HOLD SPECIMEN: NORMAL

## 2024-09-25 PROCEDURE — 36415 COLL VENOUS BLD VENIPUNCTURE: CPT | Performed by: INTERNAL MEDICINE

## 2024-09-25 PROCEDURE — 99239 HOSP IP/OBS DSCHRG MGMT >30: CPT | Performed by: HOSPITALIST

## 2024-09-25 PROCEDURE — 82565 ASSAY OF CREATININE: CPT | Performed by: INTERNAL MEDICINE

## 2024-09-25 PROCEDURE — 10002803 HB RX 637: Performed by: HOSPITALIST

## 2024-09-25 PROCEDURE — 10002803 HB RX 637: Performed by: INTERNAL MEDICINE

## 2024-09-25 PROCEDURE — 99232 SBSQ HOSP IP/OBS MODERATE 35: CPT | Performed by: NURSE PRACTITIONER

## 2024-09-25 RX ORDER — LINEZOLID 600 MG/1
600 TABLET, FILM COATED ORAL EVERY 12 HOURS SCHEDULED
Status: DISCONTINUED | OUTPATIENT
Start: 2024-09-25 | End: 2024-09-25 | Stop reason: HOSPADM

## 2024-09-25 RX ORDER — CLINDAMYCIN PHOSPHATE 11.9 MG/ML
SOLUTION TOPICAL 2 TIMES DAILY PRN
Qty: 60 ML | Refills: 0 | Status: SHIPPED | OUTPATIENT
Start: 2024-09-25

## 2024-09-25 RX ORDER — FLUOXETINE 10 MG/1
20 CAPSULE ORAL DAILY
Qty: 30 CAPSULE | Refills: 0 | Status: SHIPPED
Start: 2024-10-04

## 2024-09-25 RX ORDER — MUPIROCIN 20 MG/G
OINTMENT TOPICAL DAILY
Qty: 22 G | Refills: 0 | Status: SHIPPED
Start: 2024-09-25

## 2024-09-25 RX ADMIN — LISDEXAMFETAMINE DIMESYLATE 60 MG: 30 TABLET, CHEWABLE ORAL at 09:26

## 2024-09-25 RX ADMIN — LINEZOLID 600 MG: 600 TABLET, FILM COATED ORAL at 11:51

## 2024-09-25 RX ADMIN — METOPROLOL SUCCINATE 100 MG: 100 TABLET, EXTENDED RELEASE ORAL at 09:26

## 2024-09-25 ASSESSMENT — PAIN SCALES - GENERAL: PAINLEVEL_OUTOF10: 0

## 2024-09-26 LAB
BACTERIA BLD CULT: NORMAL
BACTERIA BLD CULT: NORMAL
BACTERIA SPEC ANAEROBE+AEROBE CULT: ABNORMAL
BACTERIA SPEC ANAEROBE+AEROBE CULT: ABNORMAL
GRAM STN SPEC: ABNORMAL

## 2024-09-27 ENCOUNTER — TELEPHONE (OUTPATIENT)
Dept: CARE COORDINATION | Age: 39
End: 2024-09-27

## 2024-09-28 ENCOUNTER — TELEPHONE (OUTPATIENT)
Dept: CARE COORDINATION | Age: 39
End: 2024-09-28

## 2024-10-01 ENCOUNTER — APPOINTMENT (OUTPATIENT)
Dept: FAMILY MEDICINE | Age: 39
End: 2024-10-01

## 2024-10-01 VITALS
WEIGHT: 289.3 LBS | OXYGEN SATURATION: 94 % | DIASTOLIC BLOOD PRESSURE: 68 MMHG | HEART RATE: 72 BPM | BODY MASS INDEX: 52.91 KG/M2 | SYSTOLIC BLOOD PRESSURE: 136 MMHG

## 2024-10-01 DIAGNOSIS — Z09 HOSPITAL DISCHARGE FOLLOW-UP: Primary | ICD-10-CM

## 2024-10-01 DIAGNOSIS — L03.317 CELLULITIS OF BUTTOCK: ICD-10-CM

## 2024-10-01 DIAGNOSIS — F39 MOOD DISORDER (CMD): ICD-10-CM

## 2024-10-01 DIAGNOSIS — G90.A POTS (POSTURAL ORTHOSTATIC TACHYCARDIA SYNDROME): ICD-10-CM

## 2024-10-01 DIAGNOSIS — F98.8 ADD (ATTENTION DEFICIT DISORDER) WITHOUT HYPERACTIVITY: ICD-10-CM

## 2024-10-01 DIAGNOSIS — F31.81 BIPOLAR 2 DISORDER  (CMD): ICD-10-CM

## 2024-10-01 DIAGNOSIS — L73.2 HIDRADENITIS SUPPURATIVA: ICD-10-CM

## 2024-10-01 RX ORDER — ONDANSETRON 8 MG/1
8 TABLET, FILM COATED ORAL EVERY 8 HOURS PRN
Qty: 20 TABLET | Refills: 1 | Status: SHIPPED | OUTPATIENT
Start: 2024-10-01

## 2024-10-01 RX ORDER — METOPROLOL SUCCINATE 100 MG/1
100 TABLET, EXTENDED RELEASE ORAL DAILY
Qty: 90 TABLET | Refills: 1 | Status: SHIPPED | OUTPATIENT
Start: 2024-10-01

## 2024-10-01 SDOH — ECONOMIC STABILITY: HOUSING INSECURITY: WHAT IS YOUR LIVING SITUATION TODAY?: I HAVE A STEADY PLACE TO LIVE

## 2024-10-01 SDOH — ECONOMIC STABILITY: GENERAL: WOULD YOU LIKE HELP WITH ANY OF THE FOLLOWING NEEDS?: I DON'T WANT HELP WITH ANY OF THESE

## 2024-10-01 SDOH — ECONOMIC STABILITY: FOOD INSECURITY: WITHIN THE PAST 12 MONTHS, THE FOOD YOU BOUGHT JUST DIDN'T LAST AND YOU DIDN'T HAVE MONEY TO GET MORE.: NEVER TRUE

## 2024-10-01 SDOH — ECONOMIC STABILITY: HOUSING INSECURITY: DO YOU HAVE PROBLEMS WITH ANY OF THE FOLLOWING?: NONE OF THE ABOVE

## 2024-10-01 ASSESSMENT — PATIENT HEALTH QUESTIONNAIRE - PHQ9
SUM OF ALL RESPONSES TO PHQ9 QUESTIONS 1 AND 2: 0
2. FEELING DOWN, DEPRESSED OR HOPELESS: NOT AT ALL
SUM OF ALL RESPONSES TO PHQ9 QUESTIONS 1 AND 2: 0
CLINICAL INTERPRETATION OF PHQ2 SCORE: NO FURTHER SCREENING NEEDED
1. LITTLE INTEREST OR PLEASURE IN DOING THINGS: NOT AT ALL

## 2024-10-01 ASSESSMENT — SOCIAL DETERMINANTS OF HEALTH (SDOH): IN THE PAST 12 MONTHS, HAS THE ELECTRIC, GAS, OIL, OR WATER COMPANY THREATENED TO SHUT OFF SERVICE IN YOUR HOME?: NO

## 2024-10-04 ENCOUNTER — TELEPHONE (OUTPATIENT)
Dept: CARE COORDINATION | Age: 39
End: 2024-10-04

## 2024-10-05 ENCOUNTER — TELEPHONE (OUTPATIENT)
Dept: CARE COORDINATION | Age: 39
End: 2024-10-05

## 2024-10-11 ENCOUNTER — TELEPHONE (OUTPATIENT)
Dept: CARE COORDINATION | Age: 39
End: 2024-10-11

## 2024-10-12 ENCOUNTER — TELEPHONE (OUTPATIENT)
Dept: CARE COORDINATION | Age: 39
End: 2024-10-12

## 2024-10-18 ENCOUNTER — TELEPHONE (OUTPATIENT)
Dept: CARE COORDINATION | Age: 39
End: 2024-10-18

## 2024-10-19 ENCOUNTER — TELEPHONE (OUTPATIENT)
Dept: CARE COORDINATION | Age: 39
End: 2024-10-19

## 2024-10-25 ENCOUNTER — TELEPHONE (OUTPATIENT)
Dept: CARE COORDINATION | Age: 39
End: 2024-10-25

## 2024-10-26 ENCOUNTER — TELEPHONE (OUTPATIENT)
Dept: CARE COORDINATION | Age: 39
End: 2024-10-26

## 2024-12-22 ENCOUNTER — WALK IN (OUTPATIENT)
Dept: URGENT CARE | Age: 39
End: 2024-12-22

## 2024-12-22 VITALS
RESPIRATION RATE: 16 BRPM | OXYGEN SATURATION: 100 % | DIASTOLIC BLOOD PRESSURE: 68 MMHG | TEMPERATURE: 98.1 F | SYSTOLIC BLOOD PRESSURE: 122 MMHG | HEART RATE: 71 BPM

## 2024-12-22 DIAGNOSIS — J02.9 PHARYNGITIS, UNSPECIFIED ETIOLOGY: ICD-10-CM

## 2024-12-22 DIAGNOSIS — J01.00 SUBACUTE MAXILLARY SINUSITIS: Primary | ICD-10-CM

## 2024-12-22 DIAGNOSIS — Z20.822 SUSPECTED COVID-19 VIRUS INFECTION: ICD-10-CM

## 2024-12-22 DIAGNOSIS — H69.93 DYSFUNCTION OF BOTH EUSTACHIAN TUBES: ICD-10-CM

## 2024-12-22 PROCEDURE — 87651 STREP A DNA AMP PROBE: CPT | Performed by: INTERNAL MEDICINE

## 2024-12-22 PROCEDURE — 0240U SARS-COV-2/INFLUENZA BY PCR: CPT | Performed by: INTERNAL MEDICINE

## 2024-12-22 PROCEDURE — 99213 OFFICE O/P EST LOW 20 MIN: CPT | Performed by: FAMILY MEDICINE

## 2024-12-22 RX ORDER — MEDICAL SUPPLY, MISCELLANEOUS
60 EACH MISCELLANEOUS 3 TIMES DAILY
Qty: 1000 ML | Refills: 1 | Status: SHIPPED | OUTPATIENT
Start: 2024-12-22 | End: 2024-12-27

## 2024-12-22 RX ORDER — GUAIFENESIN 600 MG/1
1200 TABLET, EXTENDED RELEASE ORAL 2 TIMES DAILY
Qty: 120 TABLET | Refills: 0 | Status: SHIPPED | OUTPATIENT
Start: 2024-12-22 | End: 2025-01-21

## 2024-12-22 RX ORDER — FLUCONAZOLE 150 MG/1
150 TABLET ORAL ONCE
Qty: 1 TABLET | Refills: 0 | Status: SHIPPED | OUTPATIENT
Start: 2024-12-22 | End: 2024-12-22

## 2024-12-22 RX ORDER — FLUTICASONE PROPIONATE 50 MCG
2 SPRAY, SUSPENSION (ML) NASAL DAILY
Qty: 1 EACH | Refills: 0 | Status: SHIPPED | OUTPATIENT
Start: 2024-12-22 | End: 2025-01-05

## 2024-12-22 RX ORDER — CEFUROXIME AXETIL 250 MG/1
250 TABLET ORAL 2 TIMES DAILY
Qty: 20 TABLET | Refills: 0 | Status: SHIPPED | OUTPATIENT
Start: 2024-12-22 | End: 2025-01-01

## 2024-12-22 RX ORDER — ALBUTEROL SULFATE 90 UG/1
2 INHALANT RESPIRATORY (INHALATION) EVERY 6 HOURS
Qty: 1 EACH | Refills: 0 | Status: SHIPPED | OUTPATIENT
Start: 2024-12-22 | End: 2025-01-21

## 2025-01-13 ENCOUNTER — APPOINTMENT (OUTPATIENT)
Dept: FAMILY MEDICINE | Age: 40
End: 2025-01-13

## 2025-04-21 ENCOUNTER — WALK IN (OUTPATIENT)
Dept: URGENT CARE | Age: 40
End: 2025-04-21

## 2025-04-21 VITALS
DIASTOLIC BLOOD PRESSURE: 60 MMHG | BODY MASS INDEX: 51.77 KG/M2 | TEMPERATURE: 98.1 F | RESPIRATION RATE: 16 BRPM | SYSTOLIC BLOOD PRESSURE: 100 MMHG | WEIGHT: 283.07 LBS | HEART RATE: 86 BPM | OXYGEN SATURATION: 98 %

## 2025-04-21 DIAGNOSIS — R21 RASH OF BODY: Primary | ICD-10-CM

## 2025-04-21 PROCEDURE — 99213 OFFICE O/P EST LOW 20 MIN: CPT

## 2025-04-21 RX ORDER — PREDNISONE 10 MG/1
TABLET ORAL
Qty: 30 TABLET | Refills: 0 | Status: SHIPPED | OUTPATIENT
Start: 2025-04-21

## 2025-04-21 ASSESSMENT — ENCOUNTER SYMPTOMS
SHORTNESS OF BREATH: 0
EYE PAIN: 0
EYE ITCHING: 0
SORE THROAT: 0
ABDOMINAL PAIN: 0
CHILLS: 0
FEVER: 0

## 2025-04-28 ENCOUNTER — E-ADVICE (OUTPATIENT)
Dept: FAMILY MEDICINE | Age: 40
End: 2025-04-28

## 2025-04-28 ENCOUNTER — APPOINTMENT (OUTPATIENT)
Dept: DERMATOLOGY | Age: 40
End: 2025-04-28

## 2025-04-28 DIAGNOSIS — G90.A POTS (POSTURAL ORTHOSTATIC TACHYCARDIA SYNDROME): ICD-10-CM

## 2025-04-28 RX ORDER — METOPROLOL SUCCINATE 100 MG/1
100 TABLET, EXTENDED RELEASE ORAL DAILY
Qty: 90 TABLET | Refills: 1 | Status: SHIPPED | OUTPATIENT
Start: 2025-04-28